# Patient Record
Sex: MALE | Race: WHITE | NOT HISPANIC OR LATINO | Employment: FULL TIME | URBAN - METROPOLITAN AREA
[De-identification: names, ages, dates, MRNs, and addresses within clinical notes are randomized per-mention and may not be internally consistent; named-entity substitution may affect disease eponyms.]

---

## 2021-01-11 ENCOUNTER — HOSPITAL ENCOUNTER (OUTPATIENT)
Dept: OTHER | Facility: HOSPITAL | Age: 66
Discharge: HOME | End: 2021-01-11
Attending: INTERNAL MEDICINE

## 2021-01-11 LAB
ALBUMIN SERPL-MCNC: 3.9 G/DL (ref 3.5–5)
ALBUMIN/GLOB SERPL: 1.1 {RATIO} (ref 1.4–2.6)
ALP SERPL-CCNC: 131 U/L (ref 56–155)
ALT SERPL-CCNC: 22 U/L (ref 10–40)
AMYLASE SERPL-CCNC: 48 U/L (ref 30–110)
ANION GAP SERPL CALC-SCNC: 13 MMOL/L (ref 8–19)
APPEARANCE UR: CLEAR
AST SERPL-CCNC: 20 U/L (ref 15–50)
BASOPHILS # BLD MANUAL: 0.04 10*3/UL (ref 0–0.2)
BASOPHILS NFR BLD MANUAL: 0.4 % (ref 0–3)
BILIRUB SERPL-MCNC: 0.6 MG/DL (ref 0.2–1.3)
BILIRUB UR QL: NEGATIVE
BUN SERPL-MCNC: 19 MG/DL (ref 5–25)
BUN/CREAT SERPL: 17 {RATIO} (ref 6–20)
CALCIUM SERPL-MCNC: 9.4 MG/DL (ref 8.7–10.4)
CHLORIDE SERPL-SCNC: 96 MMOL/L (ref 99–111)
COLOR UR: ABNORMAL
CONV CO2: 27 MMOL/L (ref 22–32)
CONV LEUKOCYTE ESTERASE: NEGATIVE
CONV TOTAL PROTEIN: 7.5 G/DL (ref 6.3–8.2)
CONV UROBILINOGEN IN URINE BY AUTOMATED TEST STRIP: 0.2 {EHRLICHU}/DL (ref 0.1–1)
CREAT UR-MCNC: 1.11 MG/DL (ref 0.7–1.2)
DEPRECATED RDW RBC AUTO: 41.9 FL
EOSINOPHIL # BLD MANUAL: 0.05 10*3/UL (ref 0–0.7)
EOSINOPHIL NFR BLD MANUAL: 0.5 % (ref 0–7)
ERYTHROCYTE [DISTWIDTH] IN BLOOD BY AUTOMATED COUNT: 12.3 % (ref 11.5–14.5)
GFR SERPLBLD BASED ON 1.73 SQ M-ARVRAT: >60 ML/MIN/{1.73_M2}
GLOBULIN UR ELPH-MCNC: 3.6 G/DL (ref 2–3.5)
GLUCOSE 24H UR-MCNC: NEGATIVE MG/DL
GLUCOSE SERPL-MCNC: 115 MG/DL (ref 70–99)
GRANS (ABSOLUTE): 7.63 10*3/UL (ref 2–8)
GRANS: 75.4 % (ref 30–85)
HBA1C MFR BLD: 13.6 G/DL (ref 14–18)
HCT VFR BLD AUTO: 39.8 % (ref 42–52)
HGB UR QL STRIP: NEGATIVE
IMM GRANULOCYTES # BLD: 0.04 10*3/UL (ref 0–0.54)
IMM GRANULOCYTES NFR BLD: 0.4 % (ref 0–0.43)
KETONES UR QL STRIP: NEGATIVE MG/DL
LIPASE SERPL-CCNC: 40 U/L (ref 5–51)
LYMPHOCYTES # BLD MANUAL: 1.85 10*3/UL (ref 1–5)
LYMPHOCYTES NFR BLD MANUAL: 5 % (ref 3–10)
MCH RBC QN AUTO: 31.4 PG (ref 27–31)
MCHC RBC AUTO-ENTMCNC: 34.2 G/DL (ref 33–37)
MCV RBC AUTO: 91.9 FL (ref 80–96)
MONOCYTES # BLD AUTO: 0.51 10*3/UL (ref 0.2–1.2)
NITRITE UR-MCNC: NEGATIVE MG/ML
OSMOLALITY SERPL CALC.SUM OF ELEC: 277 MOSM/KG (ref 273–304)
PH UR STRIP.AUTO: 5.5 [PH] (ref 5–8)
PLATELET # BLD AUTO: 556 10*3/UL (ref 130–400)
PMV BLD AUTO: 9.4 FL (ref 7.4–10.4)
POTASSIUM SERPL-SCNC: 4.4 MMOL/L (ref 3.5–5.3)
PROT UR-MCNC: NEGATIVE MG/DL
RBC # BLD AUTO: 4.33 10*6/UL (ref 4.7–6.1)
SODIUM SERPL-SCNC: 132 MMOL/L (ref 135–147)
SP GR UR STRIP: >=1.03 (ref 1–1.03)
SPECIMEN SOURCE: ABNORMAL
VARIANT LYMPHS NFR BLD MANUAL: 18.3 % (ref 20–45)
WBC # BLD AUTO: 10.12 10*3/UL (ref 4.8–10.8)

## 2021-01-13 LAB — BACTERIA UR CULT: NORMAL

## 2021-01-27 ENCOUNTER — HOSPITAL ENCOUNTER (OUTPATIENT)
Dept: OTHER | Facility: HOSPITAL | Age: 66
Discharge: HOME | End: 2021-01-27
Attending: INTERNAL MEDICINE

## 2021-02-22 ENCOUNTER — OFFICE VISIT CONVERTED (OUTPATIENT)
Dept: FAMILY MEDICINE CLINIC | Age: 66
End: 2021-02-22
Attending: FAMILY MEDICINE

## 2021-04-28 ENCOUNTER — OFFICE VISIT CONVERTED (OUTPATIENT)
Dept: FAMILY MEDICINE CLINIC | Age: 66
End: 2021-04-28
Attending: NURSE PRACTITIONER

## 2021-05-18 NOTE — PROGRESS NOTES
Kendrick Vargas  1955     Office/Outpatient Visit    Visit Date: Wed, Apr 28, 2021 10:49 am    Provider: Swathi De La Cruz N.P. (Assistant: Toña Byrd,  )    Location: [facility]        Electronically signed by Swathi De La Cruz N.P. on  04/28/2021 12:35:52 PM                             Subjective:        CC: Mr. Vargas is a 66 year old White male.  Establish care (Dr Hernandez has been his PCP) would like to come here some too, David is not always available        HPI:           Patient presents with essential (primary) hypertension.  His current cardiac medication regimen includes a combination medication ( Diovan HCT ).  He is taking this rx BID. He is tolerating the medication well without side effects.  Compliance with treatment has been good; he takes his medication as directed.            Low back pain, chronic details; he notes some pain relief with Mobic.        testosterone def    Symptoms are relieved with testosteron inj, getting at [facility].  he takes testosterone inj weekly usually, started on rx 2-3 years  / gets labs at  [facility]      takes ambien prn, better     Takes ambien prn and has taken melatonin     ROS:     CONSTITUTIONAL:  Negative for fever.      EYES:  Positive for blurred vision ( bilaterally ).  does wear readers, >4    CARDIOVASCULAR:  Negative for chest pain, palpitations, tachycardia, orthopnea, and edema.      RESPIRATORY:  Negative for recent cough and dyspnea.      GASTROINTESTINAL:  Positive for constipation ( occ ).   Negative for melena ( recent negative cologuard ).      NEUROLOGICAL:  Positive for dizziness/syncope 1-2021.  has not felt good since his knee surgery, right in , feels like the anesthesia effected him    ENDOCRINE:  Positive for hot flahes/flushed.  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          Past Medical  History / Family History / Social History:         Last Reviewed on 2021 11:00 AM by Swathi De La Cruz    Past Medical History:             PREVENTIVE HEALTH MAINTENANCE             COLORECTAL CANCER SCREENING: Up to date (colonoscopy q10y; sigmoidoscopy q5y; Cologuard q3y) was last done ; colonoscopy with normal results; REPORT NOT IN CHART     DENTAL CLEANING: was last done      EYE EXAM: RECOMMENDED 21         PAST MEDICAL HISTORY         hearing loss bilateral, wears hearing aids         Surgical History:         Joint Replacement: RIGHT KNEE;;; 21; sixth surgery;     Vasectomy         Family History:     Father:  at age 73; Cause of death was liver cancer     Mother:  at age 75; Cause of death was lung cancer;  Type 2 Diabetes     Brother(s): 1 brother(s) total; 1 ;  GSW at 30     Sister(s): Healthy; 1 sister(s) total     Son(s): 2 son(s) total;  Obesity     Daughter(s): 1 daughter(s) total         Social History:     Occupation: [employer]. Retired (Prior occupation: teacher/)     Marital Status:  ( and remarried)     Children: 3 children and 2 step-children     Hobbies/Recreation: he enjoys farming;         Tobacco/Alcohol/Supplements:     Last Reviewed on 2021 12:32 PM by Swathi De La Cruz    Tobacco: He has never smoked.  (he also reports chewing 2 mg nicotine  gum/ 15 per day for 15 years) 21         Immunizations:     COVID-19, mRNA, LNP-S, PF, 100 mcg/0.5 mL dose 2021    COVID-19, mRNA, LNP-S, PF, 100 mcg/0.5 mL dose 3/17/2021        Allergies:     Last Reviewed on 2021 11:00 AM by Swathi De La Cruz    No Known Allergies.        Current Medications:     Last Reviewed on 2021 10:52 AM by Toña Byrd    zolpidem 10 mg oral tablet [take 1/2 to 1 tablet (10 mg) by oral route once daily at bedtime]    testosterone cypionate 200 mg/mL intramuscular Oil [Inject 200mg/ 1ml once a week IM]    Meloxicam 15 mg oral tablet  [Take 1/2 tablet(s) by mouth BID]    melatonin 10 mg oral Tablet,disintegrating    XyzaL 2.5 mg/5 mL oral Solution [take 10 milliliters (5 mg) by oral route once daily in the evening]    VALSART/HCTZ -12.5  [one BID ]        Objective:        Vitals:         Current: 4/28/2021 10:51:51 AM    Ht:  5 ft, 6.5 in;  Wt: 202.6 lbs;  BMI: 32.2T: 97.7 F (temporal);  BP: 124/85 mm Hg (right arm, sitting);  P: 65 bpm (right arm (BP Cuff), sitting)        Exams:     PHYSICAL EXAM:     GENERAL: Vitals recorded well developed, well nourished;  well groomed;  no apparent distress;     NECK: carotid exam reveals no bruits;     RESPIRATORY: normal respiratory rate and pattern with no distress; normal breath sounds with no rales, rhonchi, wheezes or rubs;     CARDIOVASCULAR: normal rate; rhythm is regular;  no systolic murmur; no edema;     PSYCHIATRIC:  appropriate affect and demeanor; normal speech pattern; grossly normal memory;         Assessment:         I10   Essential (primary) hypertension       M54.5   Low back pain, chronic       E29.1   Testicular hypofunction       G47.09   Other insomnia       J30.9   Allergic rhinitis, unspecified       M17.11   Unilateral primary osteoarthritis, right knee       H53.123   Transient visual loss, bilateral           Plan:         Essential (primary) hypertensioncontinue current rx's, get records from [facility] er and [facility]/advised to cut back on chewing nicotine gum/after reviewing records /labs will have him come back in for follow up labs        RECOMMENDATIONS given include: perform routine monitoring of blood pressure with home blood pressure cuff.          Low back pain, chroniccontinue mobic         Testicular hypofunctionsend for last labs         Allergic rhinitis, unspecifiedtry to keep the 5 dogs & 3 cats out of his bedroom, they are his wife's pets, try flonase with xyzal        Unilateral primary osteoarthritis, right kneesend for op notes, Dr colmenares , surgery  done in [place] (thinks his surgery/anesthesia effected)        Transient visual loss, bilateraladvised to see an optometrist for eye exam/will check an A1C when next labs are drawn            Patient Recommendations:        For  Essential (primary) hypertension:    Begin monitoring your blood pressure by brief nurse visits at our office, a home blood pressure monitor, or by checking on the machines in pharmacies or stores.  Keep a log of the readings.              Charge Capture:         Primary Diagnosis:     I10  Essential (primary) hypertension           Orders:      32405  Office/outpatient visit; established patient, level 4  (In-House)              M54.5  Low back pain, chronic     E29.1  Testicular hypofunction     G47.09  Other insomnia     J30.9  Allergic rhinitis, unspecified     M17.11  Unilateral primary osteoarthritis, right knee     H53.123  Transient visual loss, bilateral

## 2021-05-18 NOTE — PROGRESS NOTES
Kendrick Vargas  1955     Office/Outpatient Visit    Visit Date: Mon, Feb 22, 2021 01:51 pm    Provider: Kendrick Deal MD (Assistant: Diana De La Cruz,  )    Location: [facility]        Electronically signed by Kendrick Deal MD on  02/22/2021 05:10:57 PM                             Subjective:        CC: Mr. Vargas is a 65 year old White male.  This is his first visit to the clinic.          HPI:           PHQ-9 Depression Screening: Completed form scanned and in chart; Total Score 7           Dx with essential (primary) hypertension; his current cardiac medication regimen includes a diuretic and an angiotensin receptor blocker.  Compliance with treatment has been good.  Mr. Vargas does not check his blood pressure other than at his clinic appointments.            Complaint of testicular hypofunction..  The symptom began years ago.  The severity is of moderate intensity.  HAD BEEN OFF HIS SHOTS BUT RECENTLY RESTARTED THEM           Additionally, he presents with history of other insomnia.  this has been noted for the past several months.  Sleep has been disrupted by a delay in initiation of sleep and frequent awakenings.  Other details: HAS BEEN ON CHRONIC AMBIEN.      ROS:     CONSTITUTIONAL:  Positive for fatigue ( SINCE RECENT TKR, NEG W/U EXCEPT THE LOW TESTOSTERONE,  FEELING S/W BETTER ).   Negative for chills or fever.      E/N/T:  Negative for ear pain, nasal congestion and sore throat.      CARDIOVASCULAR:  Negative for chest pain and palpitations.      RESPIRATORY:  Negative for recent cough and dyspnea.      GASTROINTESTINAL:  Negative for abdominal pain, nausea and vomiting.      GENITOURINARY:  Negative for dysuria, hematuria and impotence.      MUSCULOSKELETAL:  Positive for back pain ( chronic ).   Negative for myalgias.      INTEGUMENTARY:  Negative for atypical mole(s) and rash.      NEUROLOGICAL:  Positive for weakness ( generalized; ONSET AFTER THE TKR, STATES  NEG W/U EXCEPT FOR THE LOW TESTOSTERONE ).   Negative for headaches.      PSYCHIATRIC:  Positive for feelings of stress.   Negative for suicidal thoughts.          Past Medical History / Family History / Social History:         Last Reviewed on 2/22/2021 04:51 PM by Kendrick Deal    Past Medical History:             PREVENTIVE HEALTH MAINTENANCE             COLORECTAL CANCER SCREENING: Up to date (colonoscopy q10y; sigmoidoscopy q5y; Cologuard q3y) was last done 2011; colonoscopy with normal results; REPORT NOT IN CHART     DENTAL CLEANING: was last done 2020     EYE EXAM: RECOMMENDED 2/22/21         Surgical History:         Joint Replacement: RIGHT KNEE;;;     Vasectomy         Social History:     Occupation:    Retired     Marital Status:          Tobacco/Alcohol/Supplements:     Last Reviewed on 2/22/2021 04:51 PM by Kendrick Deal    Tobacco: He has never smoked.          Substance Abuse History:     Last Reviewed on 2/22/2021 04:51 PM by Kendrick Deal    None         Mental Health History:     Last Reviewed on 7/13/2015 02:23 PM by Kasey Carroll        Communicable Diseases (eg STDs):     Last Reviewed on 7/13/2015 02:23 PM by Kasey Carroll        Current Problems:     Last Reviewed on 2/22/2021 04:51 PM by Kendrick Deal    Essential (primary) hypertension    Hypertension    BRIELLE (CPAP)    Testicular hypofunction    Other insomnia    Low back pain, chronic    Encounter for screening for depression    Other long term (current) drug therapy        Immunizations:     None        Allergies:     Last Reviewed on 2/22/2021 04:51 PM by Kendrick Deal    No Known Allergies.        Current Medications:     Last Reviewed on 2/22/2021 04:51 PM by Kendrick Deal    Meloxicam 15 mg oral tablet [Take 1/2 tablet(s) by mouth BID]    zolpidem 10 mg oral tablet [take 1/2 to 1 tablet (10 mg) by oral route once daily at bedtime]     valsartan-hydrochlorothiazide 160-12.5 mg oral tablet [take 1 tablet by oral route once daily]    testosterone cypionate         Objective:        Vitals:         Current: 2/22/2021 2:03:23 PM    Ht:  5 ft, 6.5 in;  Wt: 201.6 lbs;  BMI: 32.1T: 95.1 F (oral);  BP: 141/87 mm Hg (left arm, sitting);  P: 67 bpm (left arm (BP Cuff), sitting)        Exams:     PHYSICAL EXAM:     GENERAL: Vitals recorded well developed, well nourished;  well groomed;  no apparent distress;     NECK: trachea is midline; thyroid is non-palpable;     RESPIRATORY: normal respiratory rate and pattern with no distress; normal breath sounds with no rales, rhonchi, wheezes or rubs;     CARDIOVASCULAR: normal rate; rhythm is regular;  no systolic murmur; no edema;     GASTROINTESTINAL: nontender;     LYMPHATICS:  no adenopathy in cervical, supraclavicular, axillary, or inguinal regions;     NEUROLOGIC: GROSSLY INTACT     PSYCHIATRIC:  appropriate affect and demeanor; normal speech pattern; grossly normal memory;         Lab/Test Results:         LABORATORY RESULTS:     STATES NORMAL ROUTINE LABS < TWO MONTHS AGO;     Amphetamines Screen, Urin: Negative (02/22/2021),     BAR-Barbiturates Screen, Urin: Negative (02/22/2021),     Buprenorphine: Negative (02/22/2021),     BZO-Benzodiazepines Screen,Ur: Negative (02/22/2021),     Cocaine(Metab.)Screen, Ur: Negative (02/22/2021),     MDMA-Ecstasy: Negative (02/22/2021),     Met-Methamphetamine: Negative (02/22/2021),     MTD-Methadone Screen, Urine: Negative (02/22/2021),     Opiate Screen, Urine: Positive (02/22/2021),     OXY-Oxycodone: Negative (02/22/2021),     PCP-Phencyclidine Screen, Uri: Negative (02/22/2021),     THC Cannabinoids Screen, Urin: Negative (02/22/2021),     Urine temperature: confirmed (02/22/2021),     Date and time of last pill: ambien 2/21/21 10pm; hydrocodone 2/20/21; testosterone 2/19/21 (02/22/2021),     Performed by: may (02/22/2021),     Collection Time: 15:24 (02/22/2021),              Assessment:         I10   Essential (primary) hypertension       E29.1   Testicular hypofunction       G47.09   Other insomnia       Z79.899   Other long term (current) drug therapy       Z13.31   Encounter for screening for depression           ORDERS:         Lab Orders:       [id number]  Drug test prsmv qual dir optical obs per day  (In-House)              Other Orders:         Depression screen negative  (In-House)                      Plan:         Essential (primary) hypertension        MEDICATIONS: (no change to current medication regimen)     RECOMMENDATIONS given include: perform routine monitoring of blood pressure with home blood pressure cuff.      FOLLOW-UP: Schedule a follow-up visit in 2 months.      ONLY FAIR CONTRO ON CURRENT MEDS.  WILL SEND FOR RECORDS.  MIPS Positive Depression Screen but after further evaluation the patient does not have a diagnosis of depression.            Orders:         Depression screen negative  (In-House)              Testicular hypofunction        MEDICATIONS: (no change to current medication regimen)     WILL GET MED DOSE FROM PHARMACY Observe for now Consider further workup         Other insomnia    Controlled substance documentation: Skip reviewed; drug screen performed and appropriate; consent is reviewed and signed and on the chart.  He is aware of risk of addiction on this medication, understands that he will need to follow up for a review every 3 months and his medications will be adjusted or decreased as deemed appropriate at each visit.  No history of drug or alcohol abuse.  No concerns about diversion or abuse. He denies side effects related to the medication.  He is aware that he may be called in for pill counts.  The dosing of this medication will be reviewed on a regular basis and reduced if possible..  Ongoing use of a controlled substance is necessary for this patient to have a normal quality of life Re-check blood pressure         Other  long term (current) drug therapy    LABORATORY:  Labs ordered to be performed today include Drug screen.            Orders:       [id number]  Drug test prsmv qual dir optical obs per day  (In-House)                  Patient Recommendations:        For  Essential (primary) hypertension:    Begin monitoring your blood pressure by brief nurse visits at our office, a home blood pressure monitor, or by checking on the machines in pharmacies or stores.  Keep a log of the readings.  Schedule a follow-up visit in 2 months.          For  Testicular hypofunction:    I also recommend WILL GET MED DOSE FROM PHARMACY.              Charge Capture:         Primary Diagnosis:     I10  Essential (primary) hypertension           Orders:      [id number]  Office visit - new pt, level 3  (In-House)              Depression screen negative  (In-House)              E29.1  Testicular hypofunction     G47.09  Other insomnia     Z79.899  Other long term (current) drug therapy           Orders:      [id number]  Drug test prsmv qual dir optical obs per day  (In-House)              Z13.31  Encounter for screening for depression

## 2021-06-09 ENCOUNTER — OFFICE VISIT (OUTPATIENT)
Dept: FAMILY MEDICINE CLINIC | Age: 66
End: 2021-06-09

## 2021-06-09 VITALS
SYSTOLIC BLOOD PRESSURE: 123 MMHG | HEART RATE: 63 BPM | BODY MASS INDEX: 33.06 KG/M2 | DIASTOLIC BLOOD PRESSURE: 74 MMHG | HEIGHT: 67 IN | TEMPERATURE: 97.3 F | WEIGHT: 210.6 LBS

## 2021-06-09 DIAGNOSIS — R53.83 OTHER FATIGUE: ICD-10-CM

## 2021-06-09 DIAGNOSIS — E29.1 TESTOSTERONE DEFICIENCY IN MALE: ICD-10-CM

## 2021-06-09 DIAGNOSIS — J30.89 SEASONAL ALLERGIC RHINITIS DUE TO OTHER ALLERGIC TRIGGER: ICD-10-CM

## 2021-06-09 DIAGNOSIS — I10 ESSENTIAL HYPERTENSION: Primary | ICD-10-CM

## 2021-06-09 PROBLEM — J30.9 ALLERGIC RHINITIS DUE TO ALLERGEN: Status: ACTIVE | Noted: 2021-06-09

## 2021-06-09 PROCEDURE — 99214 OFFICE O/P EST MOD 30 MIN: CPT | Performed by: NURSE PRACTITIONER

## 2021-06-09 RX ORDER — MELOXICAM 15 MG/1
15 TABLET ORAL DAILY
COMMUNITY

## 2021-06-09 RX ORDER — ZOLPIDEM TARTRATE 5 MG/1
5 TABLET ORAL NIGHTLY PRN
COMMUNITY
End: 2022-05-16

## 2021-06-09 RX ORDER — TESTOSTERONE CYPIONATE 200 MG/ML
1 VIAL (ML) INTRAMUSCULAR WEEKLY
COMMUNITY
End: 2022-10-24

## 2021-06-09 RX ORDER — VALSARTAN AND HYDROCHLOROTHIAZIDE 160; 12.5 MG/1; MG/1
1 TABLET, FILM COATED ORAL 2 TIMES DAILY
COMMUNITY
End: 2021-12-09 | Stop reason: SDUPTHER

## 2021-06-09 NOTE — PROGRESS NOTES
Kendrick Vargas presents to [facility].    Chief Complaint:  Follow-up (6 MONTH- CHRONIC CONDTIONS)         History of Present Illness:  BP follow up/ running good.   Feels hot most of the time, then sweats, worse since surgery.  Intermittent. Blurred vision and dizziness since surgery. No vision exam, last knee surgery , works out 5 days a week , having stiffness , nasal pamella but only at night, he works outside every evening, has taken antihistamines.  Gets his testosterone at [facility], given inj there had one last week.        Review of Systems:  Review of Systems   Constitutional: Positive for fatigue. Negative for fever.   HENT: Negative for sore throat.    Respiratory: Negative for cough and shortness of breath.         Medical History:  Past Medical History:   • Allergic rhinitis, unspecified   • Chronic low back pain   • Essential (primary) hypertension   • Hearing loss, bilateral    wears hearing aids   • BRIELLE on CPAP   • Other insomnia   • Other long term (current) drug therapy   • Testicular hypofunction   • Transient visual loss, bilateral   • Unilateral primary osteoarthritis, right knee     Past Surgical History:   • JOINT REPLACEMENT    sixth surgery   • KNEE SURGERY    Right knee OA. removal of RTKR, deep implant removal   • VASECTOMY      Family History   Problem Relation Age of Onset   • Lung cancer Mother    • Diabetes type II Mother    • Liver cancer Father    • Obesity Son      Social History     Tobacco Use   • Smoking status: Never Smoker   • Smokeless tobacco: Never Used   • Tobacco comment: Reports chewing 2mg nicotine gum/ 15 per day for 15 years   Substance Use Topics   • Alcohol use: Not Currently       Health Maintenance Due   Topic Date Due   • TDAP/TD VACCINES (1 - Tdap) Never done   • ZOSTER VACCINE (1 of 2) Never done   • Pneumococcal Vaccine 65+ (1 of 1 - PPSV23) Never done   • HEPATITIS C SCREENING  Never done   • ANNUAL WELLNESS VISIT  Never done  "       Immunization History   Administered Date(s) Administered   • COVID-19 (MODERNA) 03/17/2021, 04/14/2021   • COVID-19 (UNSPECIFIED) 03/17/2021, 04/14/2021       No Known Allergies     Medications:  Current Outpatient Medications on File Prior to Visit   Medication Sig   • meloxicam (MOBIC) 15 MG tablet Take 7.5 mg by mouth Daily.   • Testosterone Cypionate 200 MG/ML solution Inject 1 mL as directed 1 (One) Time Per Week.   • valsartan-hydrochlorothiazide (DIOVAN-HCT) 160-12.5 MG per tablet Take 1 tablet by mouth 2 (two) times a day.   • zolpidem (AMBIEN) 5 MG tablet Take 5 mg by mouth At Night As Needed for Sleep.     No current facility-administered medications on file prior to visit.       Vital Signs:   /74 (BP Location: Right arm, Patient Position: Sitting, Cuff Size: Large Adult)   Pulse 63   Temp 97.3 °F (36.3 °C)   Ht 168.9 cm (66.5\")   Wt 95.5 kg (210 lb 9.6 oz)   BMI 33.48 kg/m²       Physical Exam:  Physical Exam  Vitals reviewed.   Constitutional:       General: He is not in acute distress.     Appearance: Normal appearance.   Neck:      Vascular: No carotid bruit.   Cardiovascular:      Rate and Rhythm: Normal rate and regular rhythm.      Heart sounds: No murmur heard.     Pulmonary:      Effort: Pulmonary effort is normal. No respiratory distress.      Breath sounds: Normal breath sounds.   Musculoskeletal:      Right lower leg: No edema.      Left lower leg: No edema.   Neurological:      Mental Status: He is alert.   Psychiatric:         Mood and Affect: Mood normal.         Behavior: Behavior normal.         Result Review      The following data was reviewed by: TY Ornelas on 06/09/2021: reviewed labs 1-11-21, no TSH or testosterone labs available and pt reports not had recently     No results found for: HGBA1C            Assessment and Plan:          Diagnoses and all orders for this visit:    1. Essential hypertension (Primary)  Assessment & Plan:  Continue to " monitor his BP and rx.  Avoid decongestants.      Orders:  -     Comprehensive Metabolic Panel  -     Lipid Panel    2. Testosterone deficiency in male  Assessment & Plan:  He has been seeing Dr Hernandez, gets weekly testosterone inj.  Had last inj a week or so ago, gets his testosterone inj at [facility].  He will return to our lab fasting tomorrow am.      Orders:  -     Testosterone, Free, Total; Future    3. Seasonal allergic rhinitis due to other allergic trigger  Assessment & Plan:  Discussed allergen avoidance, flonase, nasal saline irrigations.        4. Other fatigue  -     CBC & Differential; Future  -     TSH; Future    Follow Up   Return if symptoms worsen or fail to improve.  Patient was given instructions and counseling regarding his condition or for health maintenance advice. Please see specific information pulled into the AVS if appropriate.

## 2021-06-09 NOTE — ASSESSMENT & PLAN NOTE
He has been seeing Dr Hernandez, gets weekly testosterone inj.  Had last inj a week or so ago, gets his testosterone inj at [facility].  He will return to our lab fasting tomorrow am.

## 2021-06-14 ENCOUNTER — LAB (OUTPATIENT)
Dept: LAB | Facility: HOSPITAL | Age: 66
End: 2021-06-14

## 2021-06-14 DIAGNOSIS — E29.1 TESTOSTERONE DEFICIENCY IN MALE: ICD-10-CM

## 2021-06-14 DIAGNOSIS — R53.83 OTHER FATIGUE: ICD-10-CM

## 2021-06-14 LAB
ALBUMIN SERPL-MCNC: 4 G/DL (ref 3.5–5.2)
ALBUMIN/GLOB SERPL: 1.7 G/DL
ALP SERPL-CCNC: 109 U/L (ref 39–117)
ALT SERPL W P-5'-P-CCNC: 27 U/L (ref 1–41)
ANION GAP SERPL CALCULATED.3IONS-SCNC: 8 MMOL/L (ref 5–15)
AST SERPL-CCNC: 30 U/L (ref 1–40)
BASOPHILS # BLD AUTO: 0.03 10*3/MM3 (ref 0–0.2)
BASOPHILS NFR BLD AUTO: 0.5 % (ref 0–1.5)
BILIRUB SERPL-MCNC: 0.6 MG/DL (ref 0–1.2)
BUN SERPL-MCNC: 21 MG/DL (ref 8–23)
BUN/CREAT SERPL: 17.6 (ref 7–25)
CALCIUM SPEC-SCNC: 8.8 MG/DL (ref 8.6–10.5)
CHLORIDE SERPL-SCNC: 102 MMOL/L (ref 98–107)
CHOLEST SERPL-MCNC: 120 MG/DL (ref 0–200)
CO2 SERPL-SCNC: 28 MMOL/L (ref 22–29)
CREAT SERPL-MCNC: 1.19 MG/DL (ref 0.76–1.27)
DEPRECATED RDW RBC AUTO: 49.3 FL (ref 37–54)
EOSINOPHIL # BLD AUTO: 0.26 10*3/MM3 (ref 0–0.4)
EOSINOPHIL NFR BLD AUTO: 3.9 % (ref 0.3–6.2)
ERYTHROCYTE [DISTWIDTH] IN BLOOD BY AUTOMATED COUNT: 14.4 % (ref 12.3–15.4)
GFR SERPL CREATININE-BSD FRML MDRD: 61 ML/MIN/1.73
GLOBULIN UR ELPH-MCNC: 2.4 GM/DL
GLUCOSE SERPL-MCNC: 106 MG/DL (ref 65–99)
HCT VFR BLD AUTO: 48.8 % (ref 37.5–51)
HDLC SERPL-MCNC: 31 MG/DL (ref 40–60)
HGB BLD-MCNC: 16.4 G/DL (ref 13–17.7)
IMM GRANULOCYTES # BLD AUTO: 0.04 10*3/MM3 (ref 0–0.05)
IMM GRANULOCYTES NFR BLD AUTO: 0.6 % (ref 0–0.5)
LDLC SERPL CALC-MCNC: 75 MG/DL (ref 0–100)
LDLC/HDLC SERPL: 2.43 {RATIO}
LYMPHOCYTES # BLD AUTO: 2.12 10*3/MM3 (ref 0.7–3.1)
LYMPHOCYTES NFR BLD AUTO: 31.9 % (ref 19.6–45.3)
MCH RBC QN AUTO: 31.7 PG (ref 26.6–33)
MCHC RBC AUTO-ENTMCNC: 33.6 G/DL (ref 31.5–35.7)
MCV RBC AUTO: 94.2 FL (ref 79–97)
MONOCYTES # BLD AUTO: 0.61 10*3/MM3 (ref 0.1–0.9)
MONOCYTES NFR BLD AUTO: 9.2 % (ref 5–12)
NEUTROPHILS NFR BLD AUTO: 3.58 10*3/MM3 (ref 1.7–7)
NEUTROPHILS NFR BLD AUTO: 53.9 % (ref 42.7–76)
PLATELET # BLD AUTO: 185 10*3/MM3 (ref 140–450)
PMV BLD AUTO: 10.6 FL (ref 6–12)
POTASSIUM SERPL-SCNC: 4.2 MMOL/L (ref 3.5–5.2)
PROT SERPL-MCNC: 6.4 G/DL (ref 6–8.5)
RBC # BLD AUTO: 5.18 10*6/MM3 (ref 4.14–5.8)
SODIUM SERPL-SCNC: 138 MMOL/L (ref 136–145)
TRIGL SERPL-MCNC: 68 MG/DL (ref 0–150)
TSH SERPL DL<=0.05 MIU/L-ACNC: 3.33 UIU/ML (ref 0.27–4.2)
VLDLC SERPL-MCNC: 14 MG/DL (ref 5–40)
WBC # BLD AUTO: 6.64 10*3/MM3 (ref 3.4–10.8)

## 2021-06-14 PROCEDURE — 36415 COLL VENOUS BLD VENIPUNCTURE: CPT | Performed by: NURSE PRACTITIONER

## 2021-06-14 PROCEDURE — 80061 LIPID PANEL: CPT | Performed by: NURSE PRACTITIONER

## 2021-06-14 PROCEDURE — 84403 ASSAY OF TOTAL TESTOSTERONE: CPT

## 2021-06-14 PROCEDURE — 84443 ASSAY THYROID STIM HORMONE: CPT

## 2021-06-14 PROCEDURE — 85025 COMPLETE CBC W/AUTO DIFF WBC: CPT

## 2021-06-14 PROCEDURE — 80053 COMPREHEN METABOLIC PANEL: CPT | Performed by: NURSE PRACTITIONER

## 2021-06-14 PROCEDURE — 84402 ASSAY OF FREE TESTOSTERONE: CPT

## 2021-06-17 ENCOUNTER — TELEPHONE (OUTPATIENT)
Dept: FAMILY MEDICINE CLINIC | Age: 66
End: 2021-06-17

## 2021-06-17 LAB
TESTOST FREE SERPL-MCNC: 11.3 PG/ML (ref 6.6–18.1)
TESTOST SERPL-MCNC: 522 NG/DL (ref 264–916)

## 2021-06-17 NOTE — TELEPHONE ENCOUNTER
Pt calling for lab results from 06/14/21. KHADRA eD La Cruz has not reviewed yet. He said he was able to look at them but just wanted her input.

## 2021-06-17 NOTE — TELEPHONE ENCOUNTER
Caller: Kendrick Vargas    Relationship: Self    Best call back number: [phone]  Caller requesting test results: SELF   What test was performed: LAB WORK

## 2021-06-18 NOTE — TELEPHONE ENCOUNTER
I was waiting on them all, testosterone came in a little later, nothing in his labs explain his fatigue, checking back on him, wanted him to see the labs and respond

## 2021-07-02 VITALS
DIASTOLIC BLOOD PRESSURE: 87 MMHG | SYSTOLIC BLOOD PRESSURE: 141 MMHG | HEART RATE: 67 BPM | HEIGHT: 67 IN | BODY MASS INDEX: 31.64 KG/M2 | TEMPERATURE: 95.1 F | WEIGHT: 201.6 LBS

## 2021-07-02 VITALS
BODY MASS INDEX: 31.8 KG/M2 | DIASTOLIC BLOOD PRESSURE: 85 MMHG | TEMPERATURE: 97.7 F | HEIGHT: 67 IN | HEART RATE: 65 BPM | SYSTOLIC BLOOD PRESSURE: 124 MMHG | WEIGHT: 202.6 LBS

## 2021-07-15 ENCOUNTER — CLINICAL SUPPORT (OUTPATIENT)
Dept: FAMILY MEDICINE CLINIC | Age: 66
End: 2021-07-15

## 2021-07-15 VITALS — HEART RATE: 56 BPM | SYSTOLIC BLOOD PRESSURE: 121 MMHG | DIASTOLIC BLOOD PRESSURE: 79 MMHG

## 2021-07-27 ENCOUNTER — TELEPHONE (OUTPATIENT)
Dept: FAMILY MEDICINE CLINIC | Age: 66
End: 2021-07-27

## 2021-07-27 NOTE — TELEPHONE ENCOUNTER
HUB TO READ    HE SAID HE STILL HASN'T GOTTEN A MESSAGE ABOUT HIS LAB RESULTS.     PLEASE FOLLOW UP

## 2021-12-09 ENCOUNTER — OFFICE VISIT (OUTPATIENT)
Dept: FAMILY MEDICINE CLINIC | Age: 66
End: 2021-12-09

## 2021-12-09 ENCOUNTER — LAB (OUTPATIENT)
Dept: LAB | Facility: HOSPITAL | Age: 66
End: 2021-12-09

## 2021-12-09 VITALS
DIASTOLIC BLOOD PRESSURE: 68 MMHG | BODY MASS INDEX: 34.66 KG/M2 | HEART RATE: 54 BPM | WEIGHT: 218 LBS | SYSTOLIC BLOOD PRESSURE: 122 MMHG

## 2021-12-09 DIAGNOSIS — I10 HYPERTENSION, UNSPECIFIED TYPE: Primary | ICD-10-CM

## 2021-12-09 DIAGNOSIS — I10 HYPERTENSION, UNSPECIFIED TYPE: ICD-10-CM

## 2021-12-09 DIAGNOSIS — Z23 ENCOUNTER FOR IMMUNIZATION: ICD-10-CM

## 2021-12-09 LAB
ANION GAP SERPL CALCULATED.3IONS-SCNC: 9.2 MMOL/L (ref 5–15)
BUN SERPL-MCNC: 14 MG/DL (ref 8–23)
BUN/CREAT SERPL: 12 (ref 7–25)
CALCIUM SPEC-SCNC: 9.4 MG/DL (ref 8.6–10.5)
CHLORIDE SERPL-SCNC: 98 MMOL/L (ref 98–107)
CO2 SERPL-SCNC: 26.8 MMOL/L (ref 22–29)
CREAT SERPL-MCNC: 1.17 MG/DL (ref 0.76–1.27)
GFR SERPL CREATININE-BSD FRML MDRD: 63 ML/MIN/1.73
GLUCOSE SERPL-MCNC: 102 MG/DL (ref 65–99)
POTASSIUM SERPL-SCNC: 4.6 MMOL/L (ref 3.5–5.2)
SODIUM SERPL-SCNC: 134 MMOL/L (ref 136–145)

## 2021-12-09 PROCEDURE — 90662 IIV NO PRSV INCREASED AG IM: CPT | Performed by: NURSE PRACTITIONER

## 2021-12-09 PROCEDURE — G0008 ADMIN INFLUENZA VIRUS VAC: HCPCS | Performed by: NURSE PRACTITIONER

## 2021-12-09 PROCEDURE — 80048 BASIC METABOLIC PNL TOTAL CA: CPT

## 2021-12-09 PROCEDURE — 99213 OFFICE O/P EST LOW 20 MIN: CPT | Performed by: NURSE PRACTITIONER

## 2021-12-09 PROCEDURE — 36415 COLL VENOUS BLD VENIPUNCTURE: CPT

## 2021-12-09 RX ORDER — VALSARTAN AND HYDROCHLOROTHIAZIDE 160; 12.5 MG/1; MG/1
1 TABLET, FILM COATED ORAL 2 TIMES DAILY
Qty: 180 TABLET | Refills: 0 | Status: SHIPPED | OUTPATIENT
Start: 2021-12-09 | End: 2022-05-02

## 2021-12-09 NOTE — ASSESSMENT & PLAN NOTE
Hypertension is stable. Continue to monitor BP at home. Continue current meds. Continue to modify diet and lifestyle. Will need labs every 6 months and follow up.   Reviewed labs from June 2021

## 2021-12-09 NOTE — PROGRESS NOTES
Kendrick Vargas presents to [facility].    Chief Complaint:  Hypertension         History of Present Illness:  Hypertension:  Current medication valsartan/ HCTZ   Tolerating Medication: Yes  Checking BP at home and it is not checking   Needs refills: Yes [facility]   Labs   Lab Results       Component                Value               Date                       GLUCOSE                  106 (H)             06/14/2021                 BUN                      21                  06/14/2021                 CREATININE               1.19                06/14/2021                 EGFRIFNONA               61                  06/14/2021                 BCR                      17.6                06/14/2021                 K                        4.2                 06/14/2021                 CO2                      28.0                06/14/2021                 CALCIUM                  8.8                 06/14/2021                 ALBUMIN                  4.00                06/14/2021                 LABIL2                   1.1 (L)             01/11/2021                 AST                      30                  06/14/2021                 ALT                      27                  06/14/2021              Sees [facility], was on testosterone and was getting the inj at [facility]    is on ambien half pill at HS from dr lucas  Is on mobic for joint pain /helps   Has had covid booster at [facility]   Past Medical History:  Changes since June 2021: none           PREVENTIVE HEALTH MAINTENANCE             COLORECTAL CANCER SCREENING: Up to date (colonoscopy q10y; sigmoidoscopy q5y; Cologuard q3y) was last done 2011; colonoscopy with normal results; REPORT NOT IN CHART     DENTAL CLEANING: was last done 2020     EYE EXAM: RECOMMENDED 2/22/21         PAST MEDICAL HISTORY         hearing loss bilateral, wears hearing aids         Surgical History:         Joint Replacement: RIGHT KNEE;;;  20; sixth surgery;     Vasectomy         Family History:     Father:  at age 73; Cause of death was liver cancer     Mother:  at age 75; Cause of death was lung cancer;  Type 2 Diabetes     Brother(s): 1 brother(s) total; 1 ;  GSW at 30     Sister(s): Healthy; 1 sister(s) total     Son(s): 2 son(s) total;  Obesity     Daughter(s): 1 daughter(s) total         Social History:     Occupation: [employer]. Retired (Prior occupation: teacher/)     Marital Status:  ( and remarried)     Children: 3 children and 2 step-children     Hobbies/Recreation: he enjoys farming;           Review of Systems:  Review of Systems   Constitutional: Negative for fatigue and fever.   Respiratory: Negative for cough and shortness of breath.    Cardiovascular: Negative for chest pain, palpitations and leg swelling.   Musculoskeletal:        Joint pain, takes mobic    Neurological: Negative for numbness.          Current Outpatient Medications:   •  meloxicam (MOBIC) 15 MG tablet, Take 7.5 mg by mouth Daily., Disp: , Rfl:   •  Testosterone Cypionate 200 MG/ML solution, Inject 1 mL as directed 1 (One) Time Per Week., Disp: , Rfl:   •  valsartan-hydrochlorothiazide (DIOVAN-HCT) 160-12.5 MG per tablet, Take 1 tablet by mouth 2 (Two) Times a Day., Disp: 180 tablet, Rfl: 0  •  zolpidem (AMBIEN) 5 MG tablet, Take 5 mg by mouth At Night As Needed for Sleep., Disp: , Rfl:     Vital Signs:   Vitals:    21 1317   BP: 122/68   BP Location: Left arm   Patient Position: Sitting   Pulse: 54   Weight: 98.9 kg (218 lb)         Physical Exam:  Physical Exam  Vitals reviewed.   Constitutional:       General: He is not in acute distress.     Appearance: Normal appearance.   Neck:      Vascular: No carotid bruit.   Cardiovascular:      Rate and Rhythm: Normal rate and regular rhythm.      Heart sounds: Normal heart sounds. No murmur heard.      Pulmonary:      Effort: Pulmonary effort is normal. No respiratory  distress.      Breath sounds: Normal breath sounds.   Musculoskeletal:      Right lower leg: No edema.      Left lower leg: No edema.   Neurological:      Mental Status: He is alert.   Psychiatric:         Mood and Affect: Mood normal.         Behavior: Behavior normal.         Result Review      The following data was reviewed by: TY Ornelas on 12/09/2021:    Results for orders placed or performed in visit on 06/14/21   Testosterone, Free, Total    Specimen: Blood   Result Value Ref Range    Testosterone, Total 522 264 - 916 ng/dL    Testosterone, Free 11.3 6.6 - 18.1 pg/mL   TSH    Specimen: Blood   Result Value Ref Range    TSH 3.330 0.270 - 4.200 uIU/mL   CBC Auto Differential    Specimen: Blood   Result Value Ref Range    WBC 6.64 3.40 - 10.80 10*3/mm3    RBC 5.18 4.14 - 5.80 10*6/mm3    Hemoglobin 16.4 13.0 - 17.7 g/dL    Hematocrit 48.8 37.5 - 51.0 %    MCV 94.2 79.0 - 97.0 fL    MCH 31.7 26.6 - 33.0 pg    MCHC 33.6 31.5 - 35.7 g/dL    RDW 14.4 12.3 - 15.4 %    RDW-SD 49.3 37.0 - 54.0 fl    MPV 10.6 6.0 - 12.0 fL    Platelets 185 140 - 450 10*3/mm3    Neutrophil % 53.9 42.7 - 76.0 %    Lymphocyte % 31.9 19.6 - 45.3 %    Monocyte % 9.2 5.0 - 12.0 %    Eosinophil % 3.9 0.3 - 6.2 %    Basophil % 0.5 0.0 - 1.5 %    Immature Grans % 0.6 (H) 0.0 - 0.5 %    Neutrophils, Absolute 3.58 1.70 - 7.00 10*3/mm3    Lymphocytes, Absolute 2.12 0.70 - 3.10 10*3/mm3    Monocytes, Absolute 0.61 0.10 - 0.90 10*3/mm3    Eosinophils, Absolute 0.26 0.00 - 0.40 10*3/mm3    Basophils, Absolute 0.03 0.00 - 0.20 10*3/mm3    Immature Grans, Absolute 0.04 0.00 - 0.05 10*3/mm3               Assessment and Plan:          Diagnoses and all orders for this visit:    1. Hypertension, unspecified type (Primary)  Assessment & Plan:  Hypertension is stable. Continue to monitor BP at home. Continue current meds. Continue to modify diet and lifestyle. Will need labs every 6 months and follow up.   Reviewed labs from June 2021      Orders:  -     valsartan-hydrochlorothiazide (DIOVAN-HCT) 160-12.5 MG per tablet; Take 1 tablet by mouth 2 (Two) Times a Day.  Dispense: 180 tablet; Refill: 0  -     Basic Metabolic Panel; Future    2. Encounter for immunization  -     Fluzone High-Dose 65+yrs        Follow Up {[facility]  [employer] :23}  Return in about 6 months (around 6/9/2022).  Patient was given instructions and counseling regarding his condition or for health maintenance advice. Please see specific information pulled into the AVS if appropriate.

## 2021-12-16 ENCOUNTER — CLINICAL SUPPORT (OUTPATIENT)
Dept: FAMILY MEDICINE CLINIC | Age: 66
End: 2021-12-16

## 2021-12-16 VITALS — DIASTOLIC BLOOD PRESSURE: 83 MMHG | HEART RATE: 58 BPM | SYSTOLIC BLOOD PRESSURE: 123 MMHG

## 2021-12-16 DIAGNOSIS — I10 HYPERTENSION, UNSPECIFIED TYPE: Primary | ICD-10-CM

## 2021-12-16 PROCEDURE — 99211 OFF/OP EST MAY X REQ PHY/QHP: CPT | Performed by: NURSE PRACTITIONER

## 2022-04-14 ENCOUNTER — OFFICE VISIT (OUTPATIENT)
Dept: FAMILY MEDICINE CLINIC | Age: 67
End: 2022-04-14

## 2022-04-14 VITALS
SYSTOLIC BLOOD PRESSURE: 130 MMHG | WEIGHT: 223.4 LBS | HEART RATE: 84 BPM | DIASTOLIC BLOOD PRESSURE: 85 MMHG | BODY MASS INDEX: 35.52 KG/M2

## 2022-04-14 DIAGNOSIS — Z23 NEED FOR PNEUMOCOCCAL VACCINE: Primary | ICD-10-CM

## 2022-04-14 DIAGNOSIS — Z00.00 ROUTINE GENERAL MEDICAL EXAMINATION AT A HEALTH CARE FACILITY: ICD-10-CM

## 2022-04-14 PROCEDURE — 1170F FXNL STATUS ASSESSED: CPT | Performed by: NURSE PRACTITIONER

## 2022-04-14 PROCEDURE — G0439 PPPS, SUBSEQ VISIT: HCPCS | Performed by: NURSE PRACTITIONER

## 2022-04-14 PROCEDURE — 1159F MED LIST DOCD IN RCRD: CPT | Performed by: NURSE PRACTITIONER

## 2022-04-14 PROCEDURE — G0009 ADMIN PNEUMOCOCCAL VACCINE: HCPCS | Performed by: NURSE PRACTITIONER

## 2022-04-14 PROCEDURE — 90732 PPSV23 VACC 2 YRS+ SUBQ/IM: CPT | Performed by: NURSE PRACTITIONER

## 2022-04-14 NOTE — ASSESSMENT & PLAN NOTE
Advise regular exercise, healthy eating, always wear seat belts. Living will discussed, booklet given, fall prevention discussed.  Immunizations discussed.   To set up a yearly optometry and dental exams.    Send for records from Dr Hernandez, looking for tetanus vaccine and PSA and other labs  Advised to stop nicorette gum

## 2022-04-14 NOTE — PROGRESS NOTES
The ABCs of the Annual Wellness Visit  Subsequent Medicare Wellness Visit    Chief Complaint   Patient presents with   • Medicare Wellness-subsequent      He sees dr lance alvarez    Subjective    History of Present Illness:  Kendrick Vargas is a 67 y.o. male who presents for a Subsequent Medicare Wellness Visit.  Medicare wellness HPI  Exercises regularly: yes at least 4 times a week    Eats healthy:no   PSA: may have done at [facility]   Wears seatbelts:no  Living will:none   Optometrist: not seen one in years   Dentist: not been in over a year  Tobacco:nicotine gum multiple daily   Alcohol intake:never   Drugs:none   Falls:none   Colonoscopy: 3-23-21 cologuard negative   Immunizations:had 3 covid and his flu vaccines, unsure on tetanus         Past Medical History:             PREVENTIVE HEALTH MAINTENANCE             COLORECTAL CANCER SCREENING: Up to date (colonoscopy q10y; sigmoidoscopy q5y; Cologuard q3y) was last done ; colonoscopy with normal results; REPORT NOT IN CHART     DENTAL CLEANING: was last done        hearing loss bilateral, wears hearing aids         Surgical History:         Joint Replacement: RIGHT KNEE;;; 20; sixth surgery;     Vasectomy         Family History:     Father:  at age 73; Cause of death was liver cancer     Mother:  at age 75; Cause of death was lung cancer;  Type 2 Diabetes     Brother(s): 1 brother(s) total; 1 ;  GSW at 30     Sister(s): Healthy; 1 sister(s) total     Son(s): 2 son(s) total;  Obesity     Daughter(s): 1 daughter(s) total         Social History:     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    Compared to one year ago, the patient feels his physical   health is the  same.    Compared to one year ago, the patient feels his mental   health is the same.    Recent Hospitalizations:  He was not admitted within the past 365 days.    Current Medical Providers:  Patient Care Team:  Swathi De La Cruz APRN as PCP - General (Family Medicine)    Outpatient Medications Prior to Visit   Medication Sig Dispense Refill   • meloxicam (MOBIC) 15 MG tablet Take 7.5 mg by mouth Daily.     • Testosterone Cypionate 200 MG/ML solution Inject 1 mL as directed 1 (One) Time Per Week.     • valsartan-hydrochlorothiazide (DIOVAN-HCT) 160-12.5 MG per tablet Take 1 tablet by mouth 2 (Two) Times a Day. 180 tablet 0   • zolpidem (AMBIEN) 5 MG tablet Take 5 mg by mouth At Night As Needed for Sleep.       No facility-administered medications prior to visit.       No opioid medication identified on active medication list. I have reviewed chart for other potential  high risk medication/s and harmful drug interactions in the elderly.          Aspirin is not on active medication list.  Aspirin use is not indicated based on review of current medical condition/s. Risk of harm outweighs potential benefits.  .    Patient Active Problem List   Diagnosis   • Hypertension   • Testosterone deficiency in male   • Allergic rhinitis due to allergen   • Other fatigue   • Routine general medical examination at a health care facility   • Need for pneumococcal vaccine     Advance Care Planning  Advance Directive is not on file.  ACP discussion was declined by the patient. Patient does not have an advance directive, information provided.    Review of Systems   Constitutional: Negative for fatigue and fever.   HENT: Negative for sore throat.    Eyes: Negative for visual disturbance.   Respiratory: Negative for cough and shortness of breath.    Cardiovascular: Negative for chest pain, palpitations and leg swelling.   Gastrointestinal: Negative for abdominal pain and blood in stool.   Genitourinary: Negative for difficulty  urinating.   Musculoskeletal: Positive for arthralgias and back pain.   Skin: Negative for rash.   Hematological: Negative for adenopathy.   Psychiatric/Behavioral: Negative for sleep disturbance.        Objective    Vitals:    04/14/22 1037   BP: 130/85   BP Location: Left arm   Patient Position: Sitting   Pulse: 84   Weight: 101 kg (223 lb 6.4 oz)     Body mass index is 35.52 kg/m².  BMI has not been calculated during today's encounter.     Does the patient have evidence of cognitive impairment? No    Physical Exam  Vitals reviewed.   Constitutional:       Appearance: Normal appearance. He is well-developed.   HENT:      Head: Normocephalic and atraumatic.      Right Ear: External ear normal.      Left Ear: External ear normal.      Mouth/Throat:      Pharynx: No oropharyngeal exudate.   Eyes:      Conjunctiva/sclera: Conjunctivae normal.      Pupils: Pupils are equal, round, and reactive to light.   Cardiovascular:      Rate and Rhythm: Normal rate and regular rhythm.      Heart sounds: No murmur heard.    No friction rub. No gallop.   Pulmonary:      Effort: Pulmonary effort is normal.      Breath sounds: Normal breath sounds. No wheezing or rhonchi.   Abdominal:      General: Bowel sounds are normal. There is no distension.      Palpations: Abdomen is soft.      Tenderness: There is no abdominal tenderness.   Skin:     General: Skin is warm and dry.   Neurological:      Mental Status: He is alert and oriented to person, place, and time.      Cranial Nerves: No cranial nerve deficit.   Psychiatric:         Mood and Affect: Mood and affect normal.         Behavior: Behavior normal.         Thought Content: Thought content normal.         Judgment: Judgment normal.                 HEALTH RISK ASSESSMENT    Smoking Status:  Social History     Tobacco Use   Smoking Status Never Smoker   Smokeless Tobacco Never Used   Tobacco Comment    Reports chewing 2mg nicotine gum/ 15 per day for 15 years     Alcohol  Consumption:  Social History     Substance and Sexual Activity   Alcohol Use Not Currently     Fall Risk Screen:    THOMAS Fall Risk Assessment has not been completed.    Depression Screening:  PHQ-2/PHQ-9 Depression Screening 4/14/2022   Retired PHQ-9 Total Score -   Retired Total Score -   Little Interest or Pleasure in Doing Things 0-->not at all   Feeling Down, Depressed or Hopeless 0-->not at all   PHQ-9: Brief Depression Severity Measure Score 0       Health Habits and Functional and Cognitive Screening:  Functional & Cognitive Status 4/14/2022   Do you have difficulty preparing food and eating? No   Do you have difficulty bathing yourself, getting dressed or grooming yourself? No   Do you have difficulty using the toilet? No   Do you have difficulty moving around from place to place? No   Do you have trouble with steps or getting out of a bed or a chair? No   Current Diet Unhealthy Diet   Dental Exam Not up to date   Eye Exam Not up to date   Exercise (times per week) 4 times per week   Current Exercises Include Light Weights   Do you need help using the phone?  No   Are you deaf or do you have serious difficulty hearing?  Yes   Do you need help with transportation? No   Do you need help shopping? No   Do you need help preparing meals?  No   Do you need help with housework?  No   Do you need help with laundry? No   Do you need help taking your medications? No   Do you need help managing money? No   Do you ever drive or ride in a car without wearing a seat belt? Yes   Have you felt unusual stress, anger or loneliness in the last month? No   Who do you live with? Spouse   If you need help, do you have trouble finding someone available to you? No   Have you been bothered in the last four weeks by sexual problems? No   Do you have difficulty concentrating, remembering or making decisions? No       Age-appropriate Screening Schedule:  Refer to the list below for future screening recommendations based on patient's  age, sex and/or medical conditions. Orders for these recommended tests are listed in the plan section. The patient has been provided with a written plan.    Health Maintenance   Topic Date Due   • TDAP/TD VACCINES (1 - Tdap) Never done   • ZOSTER VACCINE (1 of 2) Never done   • INFLUENZA VACCINE  08/01/2022              Assessment/Plan   CMS Preventative Services Quick Reference  Risk Factors Identified During Encounter  Immunizations Discussed/Encouraged (specific Immunizations; Tdap, Pneumococcal 23 and Shingrix  The above risks/problems have been discussed with the patient.  Follow up actions/plans if indicated are seen below in the Assessment/Plan Section.  Pertinent information has been shared with the patient in the After Visit Summary.    Diagnoses and all orders for this visit:    1. Need for pneumococcal vaccine (Primary)  Assessment & Plan:  Update with pneumonia vaccine today     Orders:  -     Pneumococcal polysaccharide vaccine 23-valent >= 1yo subcutaneous/IM (PPSV23)    2. Routine general medical examination at a health care facility  Assessment & Plan:  Advise regular exercise, healthy eating, always wear seat belts. Living will discussed, booklet given, fall prevention discussed.  Immunizations discussed.   To set up a yearly optometry and dental exams.    Send for records from Dr Hernandez, looking for tetanus vaccine and PSA and other labs  Advised to stop nicorette gum           Follow Up:   Return for PRN .     An After Visit Summary and PPPS were made available to the patient.

## 2022-05-02 DIAGNOSIS — I10 HYPERTENSION, UNSPECIFIED TYPE: ICD-10-CM

## 2022-05-02 RX ORDER — VALSARTAN AND HYDROCHLOROTHIAZIDE 160; 12.5 MG/1; MG/1
TABLET, FILM COATED ORAL
Qty: 180 TABLET | Refills: 1 | Status: SHIPPED | OUTPATIENT
Start: 2022-05-02 | End: 2022-05-16 | Stop reason: ALTCHOICE

## 2022-05-09 ENCOUNTER — TELEPHONE (OUTPATIENT)
Dept: FAMILY MEDICINE CLINIC | Age: 67
End: 2022-05-09

## 2022-05-09 ENCOUNTER — OFFICE VISIT (OUTPATIENT)
Dept: FAMILY MEDICINE CLINIC | Age: 67
End: 2022-05-09

## 2022-05-09 VITALS — DIASTOLIC BLOOD PRESSURE: 70 MMHG | HEART RATE: 55 BPM | SYSTOLIC BLOOD PRESSURE: 100 MMHG

## 2022-05-09 DIAGNOSIS — I21.3 ST ELEVATION MYOCARDIAL INFARCTION (STEMI), UNSPECIFIED ARTERY: Primary | ICD-10-CM

## 2022-05-09 DIAGNOSIS — I10 PRIMARY HYPERTENSION: ICD-10-CM

## 2022-05-09 PROCEDURE — 93000 ELECTROCARDIOGRAM COMPLETE: CPT | Performed by: FAMILY MEDICINE

## 2022-05-09 PROCEDURE — 99214 OFFICE O/P EST MOD 30 MIN: CPT | Performed by: FAMILY MEDICINE

## 2022-05-09 RX ORDER — NITROGLYCERIN 0.4 MG/1
0.4 TABLET SUBLINGUAL
Status: DISCONTINUED | OUTPATIENT
Start: 2022-05-09 | End: 2023-01-04 | Stop reason: SDUPTHER

## 2022-05-09 RX ORDER — ASPIRIN 81 MG/1
324 TABLET, CHEWABLE ORAL ONCE
Status: COMPLETED | OUTPATIENT
Start: 2022-05-09 | End: 2022-05-09

## 2022-05-09 RX ADMIN — NITROGLYCERIN 0.4 MG: 0.4 TABLET SUBLINGUAL at 12:24

## 2022-05-09 RX ADMIN — NITROGLYCERIN 0.4 MG: 0.4 TABLET SUBLINGUAL at 12:48

## 2022-05-09 RX ADMIN — ASPIRIN 324 MG: 81 TABLET, CHEWABLE ORAL at 12:44

## 2022-05-09 NOTE — TELEPHONE ENCOUNTER
Once dr. george OV note was signed off, note was faxed to [facility] at [phone], per dr. george instruction.- clr

## 2022-05-09 NOTE — PROGRESS NOTES
Kendrick Vargas presents to [facility].    Chief Complaint:  Chest pain    Subjective       History of Present Illness:  HPI  Acute onset mid sternal pain, non radiating, 4-5/10, NO N/V, onset 1 hour ago, he was at rest, he then broke out into a sweat, so he drove himself here and EKG was done stat and he is having an anterior infarct.   Started on oxygen  4 baby aspirin at 12:22  NTG 12:24  /79 P 58  2nd NTG 12:31  /70, P 55  No known family history of heart attack.  He is overweight.  He has underlying past medical history of hypertension only.  Cholesterol was excellent at 120.  No previous personal history of heart disease.    Review of Systems:  Review of Systems   Constitutional: Negative for chills and fever.   HENT: Negative for congestion, ear discharge and sore throat.    Respiratory: Negative for shortness of breath and wheezing.    Cardiovascular: Positive for chest pain. Negative for palpitations and leg swelling.   Gastrointestinal: Negative for abdominal pain, constipation, diarrhea, nausea, vomiting and GERD.   Genitourinary: Negative for flank pain.   Neurological: Negative for dizziness and headache.   Psychiatric/Behavioral: Negative for depressed mood.        Objective   Medical History:  Past Medical History:   • Allergic rhinitis, unspecified   • Chronic low back pain   • Essential (primary) hypertension   • Hearing loss, bilateral    wears hearing aids   • BRIELLE on CPAP   • Other insomnia   • Other long term (current) drug therapy   • Testicular hypofunction   • Transient visual loss, bilateral   • Unilateral primary osteoarthritis, right knee     Past Surgical History:   • JOINT REPLACEMENT    sixth surgery   • KNEE SURGERY    Right knee OA. removal of RTKR, deep implant removal   • VASECTOMY      Family History   Problem Relation Age of Onset   • Lung cancer Mother    • Diabetes type II Mother    • Liver cancer Father    • Obesity Son      Social  History     Tobacco Use   • Smoking status: Never Smoker   • Smokeless tobacco: Never Used   • Tobacco comment: Reports chewing 2mg nicotine gum/ 15 per day for 15 years   Substance Use Topics   • Alcohol use: Not Currently       Health Maintenance Due   Topic Date Due   • TDAP/TD VACCINES (1 - Tdap) Never done   • ZOSTER VACCINE (1 of 2) Never done   • HEPATITIS C SCREENING  Never done        Immunization History   Administered Date(s) Administered   • COVID-19 (MODERNA) 1st, 2nd, 3rd Dose Only 03/17/2021, 04/14/2021   • COVID-19 (UNSPECIFIED) 03/17/2021, 04/14/2021, 11/22/2021   • Fluzone High-Dose 65+yrs 12/09/2021   • Pneumococcal Polysaccharide (PPSV23) 04/14/2022       No Known Allergies     Medications:  Current Outpatient Medications on File Prior to Visit   Medication Sig   • meloxicam (MOBIC) 15 MG tablet Take 7.5 mg by mouth Daily.   • Testosterone Cypionate 200 MG/ML solution Inject 1 mL as directed 1 (One) Time Per Week.   • valsartan-hydrochlorothiazide (DIOVAN-HCT) 160-12.5 MG per tablet TAKE 1 TABLET TWICE A DAY   • zolpidem (AMBIEN) 5 MG tablet Take 5 mg by mouth At Night As Needed for Sleep.     No current facility-administered medications on file prior to visit.       Vital Signs:   /79   Pulse 58       Physical Exam:  Physical Exam  Vitals and nursing note reviewed.   Constitutional:       General: He is not in acute distress.     Appearance: Normal appearance. He is not ill-appearing, toxic-appearing or diaphoretic.   HENT:      Head: Normocephalic and atraumatic.      Nose: No congestion or rhinorrhea.      Mouth/Throat:      Mouth: Mucous membranes are moist.      Pharynx: Oropharynx is clear. No oropharyngeal exudate or posterior oropharyngeal erythema.   Eyes:      Extraocular Movements: Extraocular movements intact.      Conjunctiva/sclera: Conjunctivae normal.      Pupils: Pupils are equal, round, and reactive to light.   Cardiovascular:      Rate and Rhythm: Regular rhythm.  Bradycardia present.      Heart sounds: Normal heart sounds.   Pulmonary:      Effort: Pulmonary effort is normal.      Breath sounds: Normal breath sounds. No wheezing, rhonchi or rales.   Abdominal:      General: Abdomen is flat.      Palpations: Abdomen is soft.   Musculoskeletal:      Cervical back: Neck supple. No rigidity.   Lymphadenopathy:      Cervical: No cervical adenopathy.   Skin:     General: Skin is warm and dry.   Neurological:      Mental Status: He is alert and oriented to person, place, and time.   Psychiatric:         Mood and Affect: Mood normal.         Behavior: Behavior normal.         Result Review      The following data was reviewed by Renée Hyman MD on 05/09/2022.  Lab Results   Component Value Date    WBC 6.64 06/14/2021    HGB 16.4 06/14/2021    HCT 48.8 06/14/2021    MCV 94.2 06/14/2021     06/14/2021     Lab Results   Component Value Date    GLUCOSE 102 (H) 12/09/2021    BUN 14 12/09/2021    CREATININE 1.17 12/09/2021    EGFRIFNONA 63 12/09/2021    BCR 12.0 12/09/2021    K 4.6 12/09/2021    CO2 26.8 12/09/2021    CALCIUM 9.4 12/09/2021    ALBUMIN 4.00 06/14/2021    LABIL2 1.1 (L) 01/11/2021    AST 30 06/14/2021    ALT 27 06/14/2021     Lab Results   Component Value Date    CHOL 120 06/14/2021    TRIG 68 06/14/2021    HDL 31 (L) 06/14/2021    LDL 75 06/14/2021     Lab Results   Component Value Date    TSH 3.330 06/14/2021     No results found for: HGBA1C  No results found for: PSA           SCANNED EKG    Date/Time: 5/12/2022 10:15 AM  Performed by: Renée Hyman MD  Authorized by: Swathi De La Cruz APRN       ECG 12 Lead    Date/Time: 5/12/2022 10:19 AM  Performed by: Renée Hyman MD  Authorized by: Renée Hyman MD   Comparison: not compared with previous ECG   Rhythm: sinus rhythm  Rate: bradycardic  Q waves: III    ST Elevation: II, III, aVF and V1  ST Depression: I, aVL, V2 and V5  T elevation: II, III, aVF, V1, V2, V3, V4, V5 and V6  T  inversion: aVR and aVL    Clinical impression: abnormal EKG and myocardial infarction                Assessment and Plan:          Diagnoses and all orders for this visit:    1. ST elevation myocardial infarction (STEMI), unspecified artery (HCC) (Primary)  -     aspirin chewable tablet 324 mg  -     nitroglycerin (NITROSTAT) SL tablet 0.4 mg    2. Primary hypertension      Patient presented with an anterior ST elevation MI on EKG, he was stabilized with oxygen for baby aspirin to nitroglycerin.  Chest pain was 5-6 out of 10 midsternal pressure type pain.  Blood pressure was 130/79.   His blood pressure did drop with the nitroglycerin but he was not having a headache or any dizziness.  When EMS arrived.  His chest pain was a 1 out of 10 when he left.    Follow Up   Return if symptoms worsen or fail to improve.

## 2022-05-10 ENCOUNTER — TELEPHONE (OUTPATIENT)
Dept: FAMILY MEDICINE CLINIC | Age: 67
End: 2022-05-10

## 2022-05-10 NOTE — TELEPHONE ENCOUNTER
Wife said he had a LAD blockage and a stent was place and he was already out of surgery and doing well.

## 2022-05-10 NOTE — TELEPHONE ENCOUNTER
----- Message from TY Ornelas sent at 5/10/2022  7:30 AM EDT -----  Regarding: check on pt  Taken by ambulance to [facility], transferred to [facility], STEMI, had heart cath, stented, admitted, check on him

## 2022-05-11 ENCOUNTER — READMISSION MANAGEMENT (OUTPATIENT)
Dept: CALL CENTER | Facility: HOSPITAL | Age: 67
End: 2022-05-11

## 2022-05-11 ENCOUNTER — TRANSITIONAL CARE MANAGEMENT TELEPHONE ENCOUNTER (OUTPATIENT)
Dept: CALL CENTER | Facility: HOSPITAL | Age: 67
End: 2022-05-11

## 2022-05-11 NOTE — OUTREACH NOTE
Prep Survey    Flowsheet Row Responses   [facility] facility patient discharged from? Non-[facility]   Is LACE score < 7 ? Non-BH Discharge   Emergency Room discharge w/ pulse ox? No   Eligibility [facility]   [facility]   Date of Discharge 05/10/22   Discharge diagnosis LAD blockage and stent placement    Does the patient have one of the following disease processes/diagnoses(primary or secondary)? Other   Prep survey completed? Yes          ADAM DEVLIN - Registered Nurse

## 2022-05-11 NOTE — OUTREACH NOTE
Call Center TCM Note    Flowsheet Row Responses   [facility] patient discharged from? Non-  [[facility] then transferred to [facility]]   Does the patient have one of the following disease processes/diagnoses(primary or secondary)? Other   TCM attempt successful? Yes   Call start time 1431   Call end time 1435   Discharge diagnosis LAD blockage and stent placement    Medication alerts for this patient BRILINTA--bleeding precautions advised as well as reinforced importance of compliance   Meds reviewed with patient/caregiver? Yes   Is the patient having any side effects they believe may be caused by any medication additions or changes? No   Does the patient have all medications ordered at discharge? Yes   Is the patient taking all medications as directed (includes completed medication regime)? Yes   Medication comments Patient started on 2 new BP medications (could not provide name) as well as lipitor, Brilinta also added   Does the patient have a primary care provider?  Yes   Does the patient have an appointment with their PCP within 7 days of discharge? Yes   Comments regarding PCP Hospital PCP FOLLOW UP APPOINTMENT IS 5/16/22@0930am--pt reports he has a call to office and they are working him in for 5/13/22   Has the patient kept scheduled appointments due by today? N/A   Has home health visited the patient within 72 hours of discharge? N/A   Psychosocial issues? No   Nursing interventions --  [Pt has discharged instructions from  he reports--encouraged thorough review]   What is the patient's perception of their health status since discharge? Improving  [Denies any concerns today, no CP, SOA, palpitations.  Reviewed cardiac s/s and need to seek care. Cath site without issues. ]   Is the patient/caregiver able to teach back signs and symptoms related to disease process for when to call PCP? Yes   Is the patient/caregiver able to teach back signs and symptoms related to disease process for when to call 911? Yes    TCM call completed? Yes   Wrap up additional comments Call brief as patient was driving--          Erlinda Barillas, RN    5/11/2022, 14:39 EDT

## 2022-05-11 NOTE — TELEPHONE ENCOUNTER
Pt was d/c yesterday from [facility] and scheduled a hospital f/u for 05/16/22.  Will need TCM call.

## 2022-05-12 NOTE — PROGRESS NOTES
Records requested from [facility]. [facility] notes are already in pts chart under Media- clr

## 2022-05-16 ENCOUNTER — TELEPHONE (OUTPATIENT)
Dept: FAMILY MEDICINE CLINIC | Age: 67
End: 2022-05-16

## 2022-05-16 ENCOUNTER — OFFICE VISIT (OUTPATIENT)
Dept: FAMILY MEDICINE CLINIC | Age: 67
End: 2022-05-16

## 2022-05-16 VITALS
SYSTOLIC BLOOD PRESSURE: 131 MMHG | HEART RATE: 50 BPM | DIASTOLIC BLOOD PRESSURE: 84 MMHG | WEIGHT: 216.8 LBS | BODY MASS INDEX: 34.47 KG/M2

## 2022-05-16 DIAGNOSIS — I25.10 CAD S/P PERCUTANEOUS CORONARY ANGIOPLASTY: ICD-10-CM

## 2022-05-16 DIAGNOSIS — Z13.220 SCREENING, LIPID: ICD-10-CM

## 2022-05-16 DIAGNOSIS — R53.83 OTHER FATIGUE: Primary | ICD-10-CM

## 2022-05-16 DIAGNOSIS — I10 ESSENTIAL HYPERTENSION: Primary | ICD-10-CM

## 2022-05-16 DIAGNOSIS — M25.50 ARTHRALGIA, UNSPECIFIED JOINT: ICD-10-CM

## 2022-05-16 DIAGNOSIS — Z98.61 CAD S/P PERCUTANEOUS CORONARY ANGIOPLASTY: ICD-10-CM

## 2022-05-16 DIAGNOSIS — I21.3 ST ELEVATION MYOCARDIAL INFARCTION (STEMI), UNSPECIFIED ARTERY: ICD-10-CM

## 2022-05-16 PROCEDURE — 1111F DSCHRG MED/CURRENT MED MERGE: CPT | Performed by: NURSE PRACTITIONER

## 2022-05-16 PROCEDURE — 99495 TRANSJ CARE MGMT MOD F2F 14D: CPT | Performed by: NURSE PRACTITIONER

## 2022-05-16 RX ORDER — LISINOPRIL 5 MG/1
40 TABLET ORAL DAILY
COMMUNITY
Start: 2022-05-10

## 2022-05-16 RX ORDER — ASPIRIN 81 MG/1
81 TABLET, CHEWABLE ORAL DAILY
COMMUNITY
Start: 2022-05-10

## 2022-05-16 RX ORDER — METOPROLOL SUCCINATE 25 MG/1
12.5 TABLET, EXTENDED RELEASE ORAL DAILY
COMMUNITY
Start: 2022-05-10

## 2022-05-16 RX ORDER — ATORVASTATIN CALCIUM 40 MG/1
80 TABLET, FILM COATED ORAL DAILY
COMMUNITY
Start: 2022-05-10

## 2022-05-16 NOTE — TELEPHONE ENCOUNTER
Pt said he has an appt to f/u with the cardiologist that placed his stent in 2 or 3 weeks.   Bmp lab due 05/24/22, next lab due in one month. Both orders placed.

## 2022-05-16 NOTE — TELEPHONE ENCOUNTER
Caller: Kendrick Vargas    Relationship to patient: Self    Best call back number: [phone]    Patient is needing: PATIENT WANTED TO LET ELIZABETH KNOW HE HAS A FOLLOW UP WITH HIS CARDIOLOGIST ON 06.20.2022

## 2022-05-16 NOTE — TELEPHONE ENCOUNTER
----- Message from TY Ornelas sent at 5/16/2022 10:31 AM EDT -----  He needs bmp on 5-24-22, send to cardiology, I placed order as well, then place an order for HTN panel one month from then

## 2022-05-16 NOTE — PROGRESS NOTES
Transitional Care Follow Up Visit  Subjective     Kendrick Vargas is a 67 y.o. male who presents for a transitional care management visit.    Within 48 business hours after discharge our office contacted him via telephone to coordinate his care and needs.      I reviewed and discussed the details of that call along with the discharge summary, hospital problems, inpatient lab results, inpatient diagnostic studies, and consultation reports with Kendrick.     Current outpatient and discharge medications have been reconciled for the patient.  Reviewed by: TY Ornelas      Date of TCM Phone Call 5/11/2022   [facility]   Date of Discharge 5/10/2022       Risk for Readmission (LACE) No data recorded    CAD:  FELIC[facility] appt:   Admitted 5-9-22  Discharged : 5-10-22  Current medication: new, ASA 81 mg , Brilinta, Lipitor 40 BID, lisinopril and Toprol XL   Tolerating Medication: Yes, did have some dirrhea at HS for a few days   Condition : improved  Has a follow up with cardiology, has gotten a call from rehab but does not have an appt yet  Symptoms: occ fatigue   BP at home running around 130/82  He has chewed nicorette gum daily for years for energy and has stopped using the gum  Labs:  Lab Results       Component                Value               Date                       GLUCOSE                  102 (H)             12/09/2021                 BUN                      14                  12/09/2021                 CREATININE               1.17                12/09/2021                 EGFRIFNONA               63                  12/09/2021                 BCR                      12.0                12/09/2021                 K                        4.6                 12/09/2021                 CO2                      26.8                12/09/2021                 CALCIUM                  9.4                 12/09/2021                 ALBUMIN                  4.00                06/14/2021                  LABIL2                   1.1 (L)             2021                 AST                      30                  2021                 ALT                      27                  2021              Past Medical History changes since 22 :       Hospitalizations CAD                  hearing loss bilateral, wears hearing aids         Surgical History:     heart cath :     Joint Replacement: RIGHT KNEE;;; 21; sixth surgery;     Vasectomy         Family History:     Father:  at age 73; Cause of death was liver cancer     Mother:  at age 75; Cause of death was lung cancer;  Type 2 Diabetes     Brother(s): 1 brother(s) total; 1 ;  GSW at 30     Sister(s): Healthy; 1 sister(s) total     Son(s): 2 son(s) total;  Obesity     Daughter(s): 1 daughter(s) total         Social History:     Occupation: [employer]. Retired (Prior occupation: teacher/)     Marital Status:  ( and remarried)     Children: 3 children and 2 step-children     Hobbies/Recreation: he enjoys [employer]         Course During Hospital Stay(Copied from D/C Summary and reviewed during encounter on 2022 by TY Ornelas):            Current Outpatient Medications:   •  aspirin 81 MG chewable tablet, Chew 81 mg Daily., Disp: , Rfl:   •  atorvastatin (LIPITOR) 40 MG tablet, Take 80 mg by mouth Daily., Disp: , Rfl:   •  lisinopril (PRINIVIL,ZESTRIL) 5 MG tablet, Take 5 mg by mouth Daily., Disp: , Rfl:   •  MELATONIN PO, Take  by mouth., Disp: , Rfl:   •  metoprolol succinate XL (TOPROL-XL) 25 MG 24 hr tablet, Take 12.5 mg by mouth Daily., Disp: , Rfl:   •  ticagrelor (BRILINTA) 90 MG tablet tablet, Take 90 mg by mouth 2 (Two) Times a Day., Disp: , Rfl:   •  meloxicam (MOBIC) 15 MG tablet, Take 7.5 mg by mouth Daily., Disp: , Rfl:   •  Testosterone Cypionate 200 MG/ML solution, Inject 1 mL as directed 1 (One) Time Per Week., Disp: , Rfl:     Current Facility-Administered  Medications:   •  nitroglycerin (NITROSTAT) SL tablet 0.4 mg, 0.4 mg, Sublingual, Q5 Min PRN, Renée Hyman MD, 0.4 mg at 05/09/22 1248     Vitals:    05/16/22 0940   BP: 131/84   BP Location: Left arm   Patient Position: Sitting   Pulse: 50   Weight: 98.3 kg (216 lb 12.8 oz)       Advance Care Planning     Review of Systems   Constitutional: Negative for fever.   Respiratory: Negative for cough and shortness of breath.    Cardiovascular: Negative for palpitations and leg swelling.   Musculoskeletal: Positive for arthralgias (he was on mobic, was told to hold that rx ).        Objective   Physical Exam  Vitals reviewed.   Constitutional:       General: He is not in acute distress.     Appearance: Normal appearance.   Neck:      Vascular: No carotid bruit.   Cardiovascular:      Rate and Rhythm: Normal rate and regular rhythm.      Heart sounds: Normal heart sounds. No murmur heard.  Pulmonary:      Effort: Pulmonary effort is normal. No respiratory distress.      Breath sounds: Normal breath sounds.   Musculoskeletal:      Right lower leg: No edema.      Left lower leg: No edema.   Neurological:      Mental Status: He is alert.   Psychiatric:         Mood and Affect: Mood normal.         Behavior: Behavior normal.         DATA REVIEWED:              Assessment & Plan     Diagnoses and all orders for this visit:    1. Other fatigue (Primary)  Assessment & Plan:  Contact cardiac rehab for an appt, continue current rx's       2. CAD S/P percutaneous coronary angioplasty  Assessment & Plan:  Follow up with cardiology as directed, they want a BMP 5-24-22, return to our lab for this; to contact rehab for his rehab appt  Stay off nicorette gum  Reviewed labs over last year, to check a HTN panel in one month  Follow a heart healthy diet     Orders:  -     Basic metabolic panel; Future    3. ST elevation myocardial infarction (STEMI), unspecified artery (HCC)  Assessment & Plan:  Reviewed his discharge summary, he has  gone back to work, advised to take off work this week, see cardiology and go to rehab / wait on returning to the gym for his workouts, continue all rx given when he was discharged      4. Arthralgia, unspecified joint  Assessment & Plan:  Stay off mobic, if needed try tylenol arthritis         Follow Up      Return for 5-24-22 for labs then 6 weeks for HTN panel / sooner if needed .

## 2022-05-16 NOTE — ASSESSMENT & PLAN NOTE
Follow up with cardiology as directed, they want a BMP 5-24-22, return to our lab for this; to contact rehab for his rehab appt  Stay off nicorette gum  Reviewed labs over last year, to check a HTN panel in one month  Follow a heart healthy diet

## 2022-05-16 NOTE — ASSESSMENT & PLAN NOTE
Reviewed his discharge summary, he has gone back to work, advised to take off work this week, see cardiology and go to rehab / wait on returning to the gym for his workouts, continue all rx given when he was discharged

## 2022-05-24 ENCOUNTER — LAB (OUTPATIENT)
Dept: LAB | Facility: HOSPITAL | Age: 67
End: 2022-05-24

## 2022-05-24 DIAGNOSIS — I25.10 CAD S/P PERCUTANEOUS CORONARY ANGIOPLASTY: ICD-10-CM

## 2022-05-24 DIAGNOSIS — I10 ESSENTIAL HYPERTENSION: ICD-10-CM

## 2022-05-24 DIAGNOSIS — Z98.61 CAD S/P PERCUTANEOUS CORONARY ANGIOPLASTY: ICD-10-CM

## 2022-05-24 DIAGNOSIS — Z13.220 SCREENING, LIPID: ICD-10-CM

## 2022-05-24 LAB
ALBUMIN SERPL-MCNC: 4.5 G/DL (ref 3.5–5.2)
ALBUMIN/GLOB SERPL: 1.8 G/DL
ALP SERPL-CCNC: 124 U/L (ref 39–117)
ALT SERPL W P-5'-P-CCNC: 29 U/L (ref 1–41)
ANION GAP SERPL CALCULATED.3IONS-SCNC: 8 MMOL/L (ref 5–15)
AST SERPL-CCNC: 22 U/L (ref 1–40)
BILIRUB SERPL-MCNC: 0.8 MG/DL (ref 0–1.2)
BUN SERPL-MCNC: 16 MG/DL (ref 8–23)
BUN/CREAT SERPL: 13 (ref 7–25)
CALCIUM SPEC-SCNC: 9.4 MG/DL (ref 8.6–10.5)
CHLORIDE SERPL-SCNC: 102 MMOL/L (ref 98–107)
CHOLEST SERPL-MCNC: 74 MG/DL (ref 0–200)
CO2 SERPL-SCNC: 27 MMOL/L (ref 22–29)
CREAT SERPL-MCNC: 1.23 MG/DL (ref 0.76–1.27)
EGFRCR SERPLBLD CKD-EPI 2021: 64.7 ML/MIN/1.73
GLOBULIN UR ELPH-MCNC: 2.5 GM/DL
GLUCOSE SERPL-MCNC: 128 MG/DL (ref 65–99)
HDLC SERPL-MCNC: 29 MG/DL (ref 40–60)
LDLC SERPL CALC-MCNC: 27 MG/DL (ref 0–100)
LDLC/HDLC SERPL: 0.92 {RATIO}
POTASSIUM SERPL-SCNC: 4.3 MMOL/L (ref 3.5–5.2)
PROT SERPL-MCNC: 7 G/DL (ref 6–8.5)
SODIUM SERPL-SCNC: 137 MMOL/L (ref 136–145)
TRIGL SERPL-MCNC: 92 MG/DL (ref 0–150)
VLDLC SERPL-MCNC: 18 MG/DL (ref 5–40)

## 2022-05-24 PROCEDURE — 80061 LIPID PANEL: CPT

## 2022-05-24 PROCEDURE — 80053 COMPREHEN METABOLIC PANEL: CPT

## 2022-05-24 PROCEDURE — 36415 COLL VENOUS BLD VENIPUNCTURE: CPT

## 2022-10-24 ENCOUNTER — OFFICE VISIT (OUTPATIENT)
Dept: FAMILY MEDICINE CLINIC | Age: 67
End: 2022-10-24

## 2022-10-24 VITALS
SYSTOLIC BLOOD PRESSURE: 136 MMHG | HEART RATE: 61 BPM | DIASTOLIC BLOOD PRESSURE: 85 MMHG | HEIGHT: 67 IN | OXYGEN SATURATION: 97 % | BODY MASS INDEX: 34.47 KG/M2

## 2022-10-24 DIAGNOSIS — I21.3 ST ELEVATION MYOCARDIAL INFARCTION (STEMI), UNSPECIFIED ARTERY: Primary | ICD-10-CM

## 2022-10-24 PROCEDURE — 99213 OFFICE O/P EST LOW 20 MIN: CPT | Performed by: NURSE PRACTITIONER

## 2022-10-24 RX ORDER — ZOLPIDEM TARTRATE 5 MG/1
0.5 TABLET ORAL
COMMUNITY
Start: 2022-05-09

## 2022-10-24 RX ORDER — ACETAMINOPHEN 500 MG
500 TABLET ORAL
COMMUNITY

## 2022-10-24 RX ORDER — NITROGLYCERIN 0.4 MG/1
0.4 TABLET SUBLINGUAL
Status: SHIPPED | OUTPATIENT
Start: 2022-10-24

## 2022-10-24 RX ADMIN — NITROGLYCERIN 0.4 MG: 0.4 TABLET SUBLINGUAL at 11:45

## 2022-10-24 NOTE — PROGRESS NOTES
"Chief Complaint  Chest Pain    Subjective        Kendrick Vargas presents to [facility]  Fatigue  This is a recurrent problem. The current episode started today. The problem occurs constantly. Associated symptoms include chest pain and fatigue. Nothing aggravates the symptoms. He has tried NSAIDs for the symptoms. The treatment provided no relief.       Objective   Vital Signs:  /85 (BP Location: Left arm, Patient Position: Sitting, Cuff Size: Large Adult)   Pulse 61   Ht 168.9 cm (66.5\")   SpO2 97%   BMI 34.47 kg/m²   Estimated body mass index is 34.47 kg/m² as calculated from the following:    Height as of this encounter: 168.9 cm (66.5\").    Weight as of 5/16/22: 98.3 kg (216 lb 12.8 oz).          Physical Exam  Constitutional:       Appearance: He is diaphoretic.   HENT:      Head: Normocephalic.   Cardiovascular:      Rate and Rhythm: Normal rate.   Pulmonary:      Effort: Pulmonary effort is normal. No respiratory distress.      Breath sounds: Normal breath sounds. No stridor. No wheezing, rhonchi or rales.   Skin:     General: Skin is warm.   Neurological:      Mental Status: He is alert and oriented to person, place, and time.   Psychiatric:         Mood and Affect: Mood normal.        Result Review :{Labs  Result Review  Imaging  Med Tab  Media  Procedures  :23}                Assessment and Plan   Diagnoses and all orders for this visit:    1. ST elevation myocardial infarction (STEMI), unspecified artery (HCC) (Primary)  Comments:  EKG changes, sent to ER via EMS for further workup  Orders:  -     nitroglycerin (NITROSTAT) SL tablet 0.4 mg             Follow Up   Return if symptoms worsen or fail to improve.  Patient was given instructions and counseling regarding his condition or for health maintenance advice. Please see specific information pulled into the AVS if appropriate.       "

## 2022-12-27 ENCOUNTER — CLINICAL SUPPORT (OUTPATIENT)
Dept: FAMILY MEDICINE CLINIC | Age: 67
End: 2022-12-27

## 2022-12-27 VITALS — SYSTOLIC BLOOD PRESSURE: 152 MMHG | DIASTOLIC BLOOD PRESSURE: 86 MMHG | HEART RATE: 53 BPM

## 2022-12-29 ENCOUNTER — CLINICAL SUPPORT (OUTPATIENT)
Dept: FAMILY MEDICINE CLINIC | Age: 67
End: 2022-12-29

## 2022-12-29 VITALS — SYSTOLIC BLOOD PRESSURE: 151 MMHG | DIASTOLIC BLOOD PRESSURE: 84 MMHG | HEART RATE: 48 BPM

## 2023-01-04 ENCOUNTER — OFFICE VISIT (OUTPATIENT)
Dept: FAMILY MEDICINE CLINIC | Age: 68
End: 2023-01-04
Payer: MEDICARE

## 2023-01-04 VITALS
DIASTOLIC BLOOD PRESSURE: 74 MMHG | HEART RATE: 53 BPM | SYSTOLIC BLOOD PRESSURE: 117 MMHG | RESPIRATION RATE: 18 BRPM | HEIGHT: 67 IN | BODY MASS INDEX: 30.07 KG/M2 | WEIGHT: 191.6 LBS

## 2023-01-04 DIAGNOSIS — F41.3 OTHER MIXED ANXIETY DISORDERS: ICD-10-CM

## 2023-01-04 DIAGNOSIS — I10 ESSENTIAL HYPERTENSION: Primary | ICD-10-CM

## 2023-01-04 DIAGNOSIS — R53.83 OTHER FATIGUE: ICD-10-CM

## 2023-01-04 DIAGNOSIS — Z98.61 CAD S/P PERCUTANEOUS CORONARY ANGIOPLASTY: ICD-10-CM

## 2023-01-04 DIAGNOSIS — I25.10 CAD S/P PERCUTANEOUS CORONARY ANGIOPLASTY: ICD-10-CM

## 2023-01-04 DIAGNOSIS — E78.2 MIXED HYPERLIPIDEMIA: ICD-10-CM

## 2023-01-04 DIAGNOSIS — E29.1 TESTOSTERONE DEFICIENCY IN MALE: ICD-10-CM

## 2023-01-04 PROCEDURE — 3074F SYST BP LT 130 MM HG: CPT | Performed by: NURSE PRACTITIONER

## 2023-01-04 PROCEDURE — 99214 OFFICE O/P EST MOD 30 MIN: CPT | Performed by: NURSE PRACTITIONER

## 2023-01-04 PROCEDURE — 3078F DIAST BP <80 MM HG: CPT | Performed by: NURSE PRACTITIONER

## 2023-01-04 PROCEDURE — 1159F MED LIST DOCD IN RCRD: CPT | Performed by: NURSE PRACTITIONER

## 2023-01-04 PROCEDURE — 1160F RVW MEDS BY RX/DR IN RCRD: CPT | Performed by: NURSE PRACTITIONER

## 2023-01-04 RX ORDER — CITALOPRAM 20 MG/1
20 TABLET ORAL DAILY
COMMUNITY

## 2023-01-04 NOTE — ASSESSMENT & PLAN NOTE
He will return fasting for labs, to take 40 mg of lisinopril and stay on same dose he has been on of toprol, monitor his bp, keep his upcoming appt with cardiology; his son has taken over coaching for now and he thinks that may also have helped   As he was leaving he reported he chews nicorette gum, advised to taper off nicorette gum.

## 2023-01-04 NOTE — PROGRESS NOTES
Kendrick Vargas presents to [facility].    Chief Complaint:  Hypertension, Hyperlipidemia, and Follow-up         History of Present Illness:  Hyperlipidemia  Current medication: lipitor   Tolerating medication: Yes  Needs Refill: No    Lab Results       Component                Value               Date                       CHOL                     74                  05/24/2022                 TRIG                     92                  05/24/2022                 HDL                      29 (L)              05/24/2022                 LDL                      27                  05/24/2022              Hypertension:  Current medication: lisinopril 20 mg, he is taking 60 mg for 5 days and toprol XL 12.5 mg (25 mg half a pill daily )  Tolerating Medication: Yes  Checking BP at home and it is: 170/90 during daphne then 150//90 but better now since increasing his rx dose per Dr Hernandez's recommendation       Labs:  Lab Results       Component                Value               Date                       GLUCOSE                  128 (H)             05/24/2022                 BUN                      16                  05/24/2022                 CREATININE               1.23                05/24/2022                 EGFRIFNONA               63                  12/09/2021                 BCR                      13.0                05/24/2022                 K                        4.3                 05/24/2022                 CO2                      27.0                05/24/2022                 CALCIUM                  9.4                 05/24/2022                 ALBUMIN                  4.50                05/24/2022                 LABIL2                   1.1 (L)             01/11/2021                 AST                      22                  05/24/2022                 ALT                      29                  05/24/2022                Dr Hernandez put him on celexa in ,  but he has only been taking  for 10 days   He has been fatigued and down over the last month.  He started coaching basket ball and then the symptoms of fatigue, depression, anorexia have hit him,  He is feeling better now.  He did take a zpack and steroid from [facility] last month but stopped the steroid.  He has had blurred vision for a year and is trying to see an optometrist for an exam but has not been in yet. Was on testosterone but when he had his MI he had to stop.       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 Negative for fever.   HENT: Positive for congestion (did have sinus symptoms and [facility] treated him with zpack and steroids, which he stopped ). Dental problem: cbc.    Eyes: Positive for blurred vision (for a year ).   Respiratory: Negative for cough and shortness of breath.    Cardiovascular: Negative for chest pain, palpitations and leg swelling.   Gastrointestinal:        Anorexia    Neurological: Negative for numbness.   Psychiatric/Behavioral:        Was feeling down during daphne           Current Outpatient Medications:   •   acetaminophen (TYLENOL) 500 MG tablet, Take 1 tablet by mouth., Disp: , Rfl:   •  aspirin 81 MG chewable tablet, Chew 81 mg Daily., Disp: , Rfl:   •  atorvastatin (LIPITOR) 40 MG tablet, Take 80 mg by mouth Daily., Disp: , Rfl:   •  citalopram (CeleXA) 20 MG tablet, Take 20 mg by mouth Daily., Disp: , Rfl:   •  lisinopril (PRINIVIL,ZESTRIL) 5 MG tablet, Take 40 mg by mouth Daily., Disp: , Rfl:   •  MELATONIN PO, Take  by mouth., Disp: , Rfl:   •  meloxicam (MOBIC) 15 MG tablet, Take 15 mg by mouth Daily., Disp: , Rfl:   •  metoprolol succinate XL (TOPROL-XL) 25 MG 24 hr tablet, Take 12.5 mg by mouth Daily., Disp: , Rfl:   •  ticagrelor (BRILINTA) 90 MG tablet tablet, Take 90 mg by mouth 2 (Two) Times a Day., Disp: , Rfl:   •  zolpidem (AMBIEN) 5 MG tablet, Take 0.5 tablets by mouth., Disp: , Rfl:     Current Facility-Administered Medications:   •  nitroglycerin (NITROSTAT) SL tablet 0.4 mg, 0.4 mg, Sublingual, Q5 Min PRN, Swathi Park APRN, 0.4 mg at 10/24/22 1145    Vital Signs:   Vitals:    01/04/23 1004   BP: 117/74   BP Location: Right arm   Patient Position: Sitting   Cuff Size: Adult   Pulse: 53   Resp: 18   Weight: 86.9 kg (191 lb 9.6 oz)   Height: 168.9 cm (66.5\")   PainSc: 0-No pain         Physical Exam:  Physical Exam  Vitals reviewed.   Constitutional:       General: He is not in acute distress.     Appearance: Normal appearance.   Neck:      Vascular: No carotid bruit.   Cardiovascular:      Rate and Rhythm: Normal rate and regular rhythm.      Heart sounds: Normal heart sounds. No murmur heard.  Pulmonary:      Effort: Pulmonary effort is normal. No respiratory distress.      Breath sounds: Normal breath sounds.   Musculoskeletal:      Right lower leg: No edema.      Left lower leg: No edema.   Neurological:      Mental Status: He is alert.   Psychiatric:         Mood and Affect: Mood normal.         Behavior: Behavior normal.         Result Review      The following data was reviewed by: Swathi  TY Grimes on 01/04/2023:    Results for orders placed or performed in visit on 05/24/22   Comprehensive metabolic panel    Specimen: Blood   Result Value Ref Range    Glucose 128 (H) 65 - 99 mg/dL    BUN 16 8 - 23 mg/dL    Creatinine 1.23 0.76 - 1.27 mg/dL    Sodium 137 136 - 145 mmol/L    Potassium 4.3 3.5 - 5.2 mmol/L    Chloride 102 98 - 107 mmol/L    CO2 27.0 22.0 - 29.0 mmol/L    Calcium 9.4 8.6 - 10.5 mg/dL    Total Protein 7.0 6.0 - 8.5 g/dL    Albumin 4.50 3.50 - 5.20 g/dL    ALT (SGPT) 29 1 - 41 U/L    AST (SGOT) 22 1 - 40 U/L    Alkaline Phosphatase 124 (H) 39 - 117 U/L    Total Bilirubin 0.8 0.0 - 1.2 mg/dL    Globulin 2.5 gm/dL    A/G Ratio 1.8 g/dL    BUN/Creatinine Ratio 13.0 7.0 - 25.0    Anion Gap 8.0 5.0 - 15.0 mmol/L    eGFR 64.7 >60.0 mL/min/1.73   Lipid panel    Specimen: Blood   Result Value Ref Range    Total Cholesterol 74 0 - 200 mg/dL    Triglycerides 92 0 - 150 mg/dL    HDL Cholesterol 29 (L) 40 - 60 mg/dL    LDL Cholesterol  27 0 - 100 mg/dL    VLDL Cholesterol 18 5 - 40 mg/dL    LDL/HDL Ratio 0.92                Assessment and Plan:          Diagnoses and all orders for this visit:    1. Essential hypertension (Primary)  Assessment & Plan:  He will return fasting for labs, to take 40 mg of lisinopril and stay on same dose he has been on of toprol, monitor his bp, keep his upcoming appt with cardiology; his son has taken over coaching for now and he thinks that may also have helped   As he was leaving he reported he chews nicorette gum, advised to taper off nicorette gum.      Orders:  -     Comprehensive Metabolic Panel; Future  -     Lipid Panel; Future    2. Other fatigue  Assessment & Plan:  Return to lab fasting     Orders:  -     Vitamin B12; Future  -     Folate; Future  -     CBC w AUTO Differential; Future  -     TSH Rfx On Abnormal To Free T4; Future  -     Testosterone, Free, Total; Future    3. Testosterone deficiency in male  Assessment & Plan:  He will return  tomorrow for fasting labs       4. Other mixed anxiety disorders  Assessment & Plan:  To continue taking Celexa as directed       5. CAD S/P percutaneous coronary angioplasty  Assessment & Plan:  Continue brilinta and lipitor as recommended by his cardiologist       6. Mixed hyperlipidemia  Assessment & Plan:  Continue lipitor and follow up fasting           Follow Up   Return for fasting for labs.  Patient was given instructions and counseling regarding his condition or for health maintenance advice. Please see specific information pulled into the AVS if appropriate.

## 2023-01-05 ENCOUNTER — LAB (OUTPATIENT)
Dept: LAB | Facility: HOSPITAL | Age: 68
End: 2023-01-05
Payer: MEDICARE

## 2023-01-05 ENCOUNTER — TELEPHONE (OUTPATIENT)
Dept: FAMILY MEDICINE CLINIC | Age: 68
End: 2023-01-05
Payer: MEDICARE

## 2023-01-05 DIAGNOSIS — R53.83 OTHER FATIGUE: ICD-10-CM

## 2023-01-05 DIAGNOSIS — Z12.11 SCREEN FOR COLON CANCER: Primary | ICD-10-CM

## 2023-01-05 DIAGNOSIS — I10 ESSENTIAL HYPERTENSION: ICD-10-CM

## 2023-01-05 LAB
ALBUMIN SERPL-MCNC: 4.5 G/DL (ref 3.5–5.2)
ALBUMIN/GLOB SERPL: 2 G/DL
ALP SERPL-CCNC: 129 U/L (ref 39–117)
ALT SERPL W P-5'-P-CCNC: 27 U/L (ref 1–41)
ANION GAP SERPL CALCULATED.3IONS-SCNC: 8 MMOL/L (ref 5–15)
AST SERPL-CCNC: 20 U/L (ref 1–40)
BASOPHILS # BLD AUTO: 0.02 10*3/MM3 (ref 0–0.2)
BASOPHILS NFR BLD AUTO: 0.3 % (ref 0–1.5)
BILIRUB SERPL-MCNC: 0.6 MG/DL (ref 0–1.2)
BUN SERPL-MCNC: 14 MG/DL (ref 8–23)
BUN/CREAT SERPL: 13.3 (ref 7–25)
CALCIUM SPEC-SCNC: 9.5 MG/DL (ref 8.6–10.5)
CHLORIDE SERPL-SCNC: 100 MMOL/L (ref 98–107)
CHOLEST SERPL-MCNC: 92 MG/DL (ref 0–200)
CO2 SERPL-SCNC: 29 MMOL/L (ref 22–29)
CREAT SERPL-MCNC: 1.05 MG/DL (ref 0.76–1.27)
DEPRECATED RDW RBC AUTO: 48.2 FL (ref 37–54)
EGFRCR SERPLBLD CKD-EPI 2021: 78.3 ML/MIN/1.73
EOSINOPHIL # BLD AUTO: 0.16 10*3/MM3 (ref 0–0.4)
EOSINOPHIL NFR BLD AUTO: 2.3 % (ref 0.3–6.2)
ERYTHROCYTE [DISTWIDTH] IN BLOOD BY AUTOMATED COUNT: 13.4 % (ref 12.3–15.4)
FOLATE SERPL-MCNC: 7.35 NG/ML (ref 4.78–24.2)
GLOBULIN UR ELPH-MCNC: 2.3 GM/DL
GLUCOSE SERPL-MCNC: 111 MG/DL (ref 65–99)
HCT VFR BLD AUTO: 43.9 % (ref 37.5–51)
HDLC SERPL-MCNC: 50 MG/DL (ref 40–60)
HGB BLD-MCNC: 14.6 G/DL (ref 13–17.7)
IMM GRANULOCYTES # BLD AUTO: 0.04 10*3/MM3 (ref 0–0.05)
IMM GRANULOCYTES NFR BLD AUTO: 0.6 % (ref 0–0.5)
LDLC SERPL CALC-MCNC: 31 MG/DL (ref 0–100)
LDLC/HDLC SERPL: 0.69 {RATIO}
LYMPHOCYTES # BLD AUTO: 2.33 10*3/MM3 (ref 0.7–3.1)
LYMPHOCYTES NFR BLD AUTO: 33.1 % (ref 19.6–45.3)
MCH RBC QN AUTO: 32.3 PG (ref 26.6–33)
MCHC RBC AUTO-ENTMCNC: 33.3 G/DL (ref 31.5–35.7)
MCV RBC AUTO: 97.1 FL (ref 79–97)
MONOCYTES # BLD AUTO: 0.53 10*3/MM3 (ref 0.1–0.9)
MONOCYTES NFR BLD AUTO: 7.5 % (ref 5–12)
NEUTROPHILS NFR BLD AUTO: 3.96 10*3/MM3 (ref 1.7–7)
NEUTROPHILS NFR BLD AUTO: 56.2 % (ref 42.7–76)
PLATELET # BLD AUTO: 207 10*3/MM3 (ref 140–450)
PMV BLD AUTO: 10.2 FL (ref 6–12)
POTASSIUM SERPL-SCNC: 4.7 MMOL/L (ref 3.5–5.2)
PROT SERPL-MCNC: 6.8 G/DL (ref 6–8.5)
RBC # BLD AUTO: 4.52 10*6/MM3 (ref 4.14–5.8)
SODIUM SERPL-SCNC: 137 MMOL/L (ref 136–145)
TRIGL SERPL-MCNC: 38 MG/DL (ref 0–150)
TSH SERPL DL<=0.05 MIU/L-ACNC: 2.51 UIU/ML (ref 0.27–4.2)
VIT B12 BLD-MCNC: 402 PG/ML (ref 211–946)
VLDLC SERPL-MCNC: 11 MG/DL (ref 5–40)
WBC NRBC COR # BLD: 7.04 10*3/MM3 (ref 3.4–10.8)

## 2023-01-05 PROCEDURE — 84402 ASSAY OF FREE TESTOSTERONE: CPT

## 2023-01-05 PROCEDURE — 80053 COMPREHEN METABOLIC PANEL: CPT

## 2023-01-05 PROCEDURE — 82746 ASSAY OF FOLIC ACID SERUM: CPT

## 2023-01-05 PROCEDURE — 82607 VITAMIN B-12: CPT

## 2023-01-05 PROCEDURE — 36415 COLL VENOUS BLD VENIPUNCTURE: CPT

## 2023-01-05 PROCEDURE — 84403 ASSAY OF TOTAL TESTOSTERONE: CPT

## 2023-01-05 PROCEDURE — 85025 COMPLETE CBC W/AUTO DIFF WBC: CPT

## 2023-01-05 PROCEDURE — 80061 LIPID PANEL: CPT

## 2023-01-05 PROCEDURE — 84443 ASSAY THYROID STIM HORMONE: CPT

## 2023-01-05 NOTE — TELEPHONE ENCOUNTER
Pt said Swathi Jono wanted to send him for a colonoscopy and he had the name and number for the person he wants to see. It is Ander Muniz ([facility]) fax # [phone]

## 2023-01-08 LAB
TESTOST FREE SERPL-MCNC: 4.8 PG/ML (ref 6.6–18.1)
TESTOST SERPL-MCNC: 354 NG/DL (ref 264–916)

## 2023-01-09 DIAGNOSIS — R73.9 ELEVATED BLOOD SUGAR: Primary | ICD-10-CM

## 2023-01-10 ENCOUNTER — CLINICAL SUPPORT (OUTPATIENT)
Dept: FAMILY MEDICINE CLINIC | Age: 68
End: 2023-01-10
Payer: MEDICARE

## 2023-01-10 ENCOUNTER — LAB (OUTPATIENT)
Dept: LAB | Facility: HOSPITAL | Age: 68
End: 2023-01-10
Payer: MEDICARE

## 2023-01-10 VITALS — HEART RATE: 44 BPM | DIASTOLIC BLOOD PRESSURE: 73 MMHG | SYSTOLIC BLOOD PRESSURE: 128 MMHG

## 2023-01-10 DIAGNOSIS — R73.9 ELEVATED BLOOD SUGAR: ICD-10-CM

## 2023-01-10 LAB — HBA1C MFR BLD: 5.8 % (ref 4.8–5.6)

## 2023-01-10 PROCEDURE — 36415 COLL VENOUS BLD VENIPUNCTURE: CPT

## 2023-01-10 PROCEDURE — 83036 HEMOGLOBIN GLYCOSYLATED A1C: CPT

## 2023-01-10 NOTE — Clinical Note
B/P Check and Pulse. Please notice the result of pt's pulse. I checked it twice. Pt was feeling fine./pr

## 2023-01-10 NOTE — PROGRESS NOTES
That was his lisinopril that he increased but now is back at a lower dose.  The beta blocker lowers heart rate, he needs to keep an eye on this.  He may need to see his cardiologist sooner if there is an issue. Will wait on his A1C results first.

## 2023-01-10 NOTE — PROGRESS NOTES
/73 (BP Location: Left arm, Patient Position: Sitting)  Pulse 44   He is on Lisinopril and toprol XL 12.5 mg daily  He was chewing nicorette gum, I told him to cut back  His bp is good.    His pulse is running a little low now, clarify that he is just taking toprol XL 12.5 mg daily, see how he is doing, his A1C is still pending.  He was here to get that lab today

## 2023-03-07 ENCOUNTER — TELEPHONE (OUTPATIENT)
Dept: FAMILY MEDICINE CLINIC | Age: 68
End: 2023-03-07
Payer: MEDICARE

## 2023-03-07 RX ORDER — LISINOPRIL 5 MG/1
40 TABLET ORAL DAILY
Status: CANCELLED | OUTPATIENT
Start: 2023-03-07

## 2023-03-07 RX ORDER — ZOLPIDEM TARTRATE 5 MG/1
2.5 TABLET ORAL
Status: CANCELLED | OUTPATIENT
Start: 2023-03-07

## 2023-03-07 NOTE — TELEPHONE ENCOUNTER
Rx Refill Note  Requested Prescriptions     Pending Prescriptions Disp Refills   • lisinopril (PRINIVIL,ZESTRIL) 5 MG tablet       Sig: Take 8 tablets by mouth Daily.   • zolpidem (AMBIEN) 5 MG tablet       Sig: Take 0.5 tablets by mouth.      Last office visit with prescribing clinician: 1/4/2023   Last telemedicine visit with prescribing clinician: 4/20/2023   Next office visit with prescribing clinician: 4/20/2023  No urine drug screen found.     Called [facility] to see who refilled these last and for the count.  These were sent in last by Dr Hernandez.    Ambien 10mg, take 1/2- 1 at hs as needed on 01/25/23.  Lisinopril 20mg, take 1 1/2 tablets a day to equal 30mg.  Last sent 02/15/23, 30 day supply.     Talked to pt and he said Dr Hernandez had him taking two of the Lisinopril 20 mg daily.  Because his b/p went up.     Florencia Donovan, LPN  03/07/23, 09:43 EST

## 2023-03-07 NOTE — TELEPHONE ENCOUNTER
See note below.  I call [facility] and got the strengths corrected.  Then I talked to the pt. He said he was taking it for his b/p and Dr Hernandez increased it because his b/p went up.

## 2023-03-07 NOTE — TELEPHONE ENCOUNTER
"Caller: Kendrick Vargas \"Esteban\"    Relationship: Self    Best call back number: [phone]    Requested Prescriptions:   Requested Prescriptions     Pending Prescriptions Disp Refills   • lisinopril (PRINIVIL,ZESTRIL) 5 MG tablet       Sig: Take 8 tablets by mouth Daily.   • zolpidem (AMBIEN) 5 MG tablet       Sig: Take 0.5 tablets by mouth.        Pharmacy where request should be sent: [facility] [address] [phone] [facility] [phone] FX     Does the patient have less than a 3 day supply:  [x] Yes  [] No    Would you like a call back once the refill request has been completed: [] Yes [x] No    If the office needs to give you a call back, can they leave a voicemail: [] Yes [x] No    Cheri Romo, PCT   03/07/23 08:16 EST           "

## 2023-03-09 NOTE — TELEPHONE ENCOUNTER
Have him come by and get his BP checked and then I can get his rx sent in correctly, can then decide on lisinopril 20, 30 or 40 mg and not taking 5 mg pills and taking 8.

## 2023-03-22 ENCOUNTER — CLINICAL SUPPORT (OUTPATIENT)
Dept: FAMILY MEDICINE CLINIC | Age: 68
End: 2023-03-22
Payer: MEDICARE

## 2023-03-22 VITALS — HEART RATE: 40 BPM | DIASTOLIC BLOOD PRESSURE: 82 MMHG | SYSTOLIC BLOOD PRESSURE: 147 MMHG

## 2023-03-23 ENCOUNTER — TELEPHONE (OUTPATIENT)
Dept: FAMILY MEDICINE CLINIC | Age: 68
End: 2023-03-23
Payer: MEDICARE

## 2023-03-23 NOTE — TELEPHONE ENCOUNTER
Spoke with patient about his pulse beening low and patient ask me to call his cardio for an appt. Advised I will do this and let him know time and date

## 2023-03-23 NOTE — TELEPHONE ENCOUNTER
----- Message from TY Ornelas sent at 3/23/2023  8:50 AM EDT -----    Author: Swathi De La Cruz APRN Service: -- Author Type: Nurse Practitioner  Filed: 03/23/23 0851 Encounter Date: 3/22/2023 Status: Signed  : Swathi De La Cruz APRN (Nurse Practitioner)      /82 (BP Location: Left arm, Patient Position: Sitting)  Pulse 40 Important   He is on Lisinopril and Toprol XL, have him see his cardiologist, he was a pt of [facility], I have also been seeing him.       ----- Message -----  From: Latanya Bean LPN  Sent: 3/22/2023  11:29 AM EDT  To: TY Ornelas    Here for free bp check, taking Lisinopril 20mg, 2 tabs daily

## 2023-03-23 NOTE — TELEPHONE ENCOUNTER
Pt inf, Patient see Dr Roman for Cardio. Pt states he will and make an appt with him. If any issue with the appt patient will call back

## 2023-03-23 NOTE — PROGRESS NOTES
/82 (BP Location: Left arm, Patient Position: Sitting)  Pulse 40 Important   He is on Lisinopril and Toprol XL, have him see his cardiologist, he was a pt of [facility], I have also been seeing him.

## 2023-03-23 NOTE — TELEPHONE ENCOUNTER
And you explained that his pulse was running low, I would like to be cautious with this patient and make sure his BP/pulse are controlled and he is on the best rx's for him.?

## 2023-04-11 ENCOUNTER — OFFICE VISIT (OUTPATIENT)
Dept: FAMILY MEDICINE CLINIC | Age: 68
End: 2023-04-11
Payer: MEDICARE

## 2023-04-11 VITALS
HEART RATE: 54 BPM | OXYGEN SATURATION: 98 % | HEIGHT: 66 IN | BODY MASS INDEX: 30.47 KG/M2 | DIASTOLIC BLOOD PRESSURE: 91 MMHG | SYSTOLIC BLOOD PRESSURE: 150 MMHG

## 2023-04-11 DIAGNOSIS — I25.10 CAD S/P PERCUTANEOUS CORONARY ANGIOPLASTY: ICD-10-CM

## 2023-04-11 DIAGNOSIS — R20.0 NUMBNESS AND TINGLING IN BOTH HANDS: Primary | ICD-10-CM

## 2023-04-11 DIAGNOSIS — R00.1 BRADYCARDIA: ICD-10-CM

## 2023-04-11 DIAGNOSIS — R20.2 NUMBNESS AND TINGLING IN BOTH HANDS: Primary | ICD-10-CM

## 2023-04-11 DIAGNOSIS — I10 ESSENTIAL HYPERTENSION: ICD-10-CM

## 2023-04-11 DIAGNOSIS — Z98.61 CAD S/P PERCUTANEOUS CORONARY ANGIOPLASTY: ICD-10-CM

## 2023-04-11 RX ORDER — ATORVASTATIN CALCIUM 80 MG/1
TABLET, FILM COATED ORAL
COMMUNITY
Start: 2023-03-20 | End: 2023-04-13 | Stop reason: SDUPTHER

## 2023-04-11 RX ORDER — AMLODIPINE BESYLATE 5 MG/1
5 TABLET ORAL DAILY
COMMUNITY
Start: 2023-04-05

## 2023-04-11 RX ORDER — CLOPIDOGREL BISULFATE 75 MG/1
75 TABLET ORAL DAILY
COMMUNITY
Start: 2023-03-29

## 2023-04-11 RX ORDER — VALSARTAN 320 MG/1
320 TABLET ORAL DAILY
COMMUNITY
Start: 2023-04-05

## 2023-04-11 NOTE — PROGRESS NOTES
Subjective     CHIEF COMPLAINT    Chief Complaint   Patient presents with   • Chest Pain     History of Present Illness  Patient is a 68 year old male who presents to the clinic today concerned that he may be having a heart attack.  He has had a heart attack in the past.  His cardiologist is Dr. Roman  He had a stent placed in May of last year.  Today while he was at work he describes a feeling of being hot and tingling in both hands and upper extremities going numb.  This is exactly how his last heart attack felt.  He does not specifically complain of any chest pain at this time.  Reports that he just had a Holter monitor taken off yesterday and returned to Dr. Roman's office.    Review of Systems   Respiratory: Negative for shortness of breath and wheezing.    Cardiovascular: Negative for chest pain and palpitations.   Neurological: Positive for weakness and numbness.       No Known Allergies      Current Outpatient Medications on File Prior to Visit   Medication Sig Dispense Refill   • acetaminophen (TYLENOL) 500 MG tablet Take 1 tablet by mouth.     • amLODIPine (NORVASC) 5 MG tablet      • aspirin 81 MG chewable tablet Chew 81 mg Daily.     • atorvastatin (LIPITOR) 40 MG tablet Take 80 mg by mouth Daily.     • atorvastatin (LIPITOR) 80 MG tablet      • citalopram (CeleXA) 20 MG tablet Take 20 mg by mouth Daily.     • clopidogrel (PLAVIX) 75 MG tablet      • lisinopril (PRINIVIL,ZESTRIL) 5 MG tablet Take 40 mg by mouth Daily.     • MELATONIN PO Take  by mouth.     • meloxicam (MOBIC) 15 MG tablet Take 15 mg by mouth Daily.     • metoprolol succinate XL (TOPROL-XL) 25 MG 24 hr tablet Take 12.5 mg by mouth Daily.     • ticagrelor (BRILINTA) 90 MG tablet tablet Take 90 mg by mouth 2 (Two) Times a Day.     • valsartan (DIOVAN) 320 MG tablet      • zolpidem (AMBIEN) 5 MG tablet Take 0.5 tablets by mouth.       Current Facility-Administered Medications on File Prior to Visit   Medication Dose Route Frequency  "Provider Last Rate Last Admin   • nitroglycerin (NITROSTAT) SL tablet 0.4 mg  0.4 mg Sublingual Q5 Min PRN Swathi Park APRN   0.4 mg at 10/24/22 1145     /91 (BP Location: Left arm, Patient Position: Sitting, Cuff Size: Adult)   Pulse 54   Ht 168.9 cm (66.5\")   SpO2 98%   BMI 30.47 kg/m²     Objective     Physical Exam  Vitals and nursing note reviewed.   Constitutional:       General: He is not in acute distress.     Appearance: Normal appearance. He is not ill-appearing.   HENT:      Head: Normocephalic.      Mouth/Throat:      Lips: Pink.   Eyes:      Extraocular Movements: Extraocular movements intact.   Cardiovascular:      Rate and Rhythm: Regular rhythm. Bradycardia present.      Heart sounds: Normal heart sounds. No murmur heard.  Pulmonary:      Effort: Pulmonary effort is normal. No accessory muscle usage or respiratory distress.      Breath sounds: Normal breath sounds. No wheezing or rhonchi.   Neurological:      General: No focal deficit present.      Mental Status: He is alert and oriented to person, place, and time.      GCS: GCS eye subscore is 4. GCS verbal subscore is 5. GCS motor subscore is 6.      Cranial Nerves: No facial asymmetry.      Gait: Gait is intact.   Psychiatric:         Mood and Affect: Mood and affect normal.         Behavior: Behavior normal.             Diagnoses and all orders for this visit:    1. Numbness and tingling in both hands (Primary)  -     ECG 12 Lead    2. CAD S/P percutaneous coronary angioplasty  -     ECG 12 Lead    3. Bradycardia  -     ECG 12 Lead    4. Essential hypertension      EKG completed in office today, reviewed with on-call provider, Dr. Christina.  Given patient's history and that he reports his symptoms feel exactly the same as his last heart attack, advise he proceed to the ER for further evaluation via EMS.  Patient declines EMS at this time and he is aware of risks.  He plans to drive himself to the ER for further evaluation.  Again, I " advised him that I do not recommend he drive himself and I do advise him to take EMS but he declines EMS.  His EKG was faxed to [facility] ER. He asked about going to [facility], but I advised him that I do not recommend he travel that far at this time. I informed him that [facility] could transfer him there if needed.     Addendum: Nursing staff reported that patient called and stated he would be going to [facility] instead of [facility].     Follow up:   No follow-ups on file.  Patient was given instructions and counseling regarding his condition or for health maintenance advice. Please see specific information pulled into the AVS if appropriate.

## 2023-04-11 NOTE — PROGRESS NOTES
Procedure     ECG 12 Lead    Date/Time: 4/11/2023 5:57 PM  Performed by: Scooter Christina MD  Authorized by: Maribell Tavarez APRN   Comparison: compared with previous ECG from 5/9/2022  Similar to previous ECG  Rhythm: sinus rhythm  Rate: normal  Conduction: left anterior fascicular block  ST segment elevation noted on lead: Minimum ST elevation anterior leads.  T Waves: T waves normal  T inversion: III  QRS axis: normal    Clinical impression: abnormal EKG

## 2023-04-12 ENCOUNTER — TELEPHONE (OUTPATIENT)
Dept: FAMILY MEDICINE CLINIC | Age: 68
End: 2023-04-12
Payer: MEDICARE

## 2023-04-12 NOTE — TELEPHONE ENCOUNTER
Pt called and said he would like for A De La Cruz to refill his zolpidem 10mg.  He takes 1/2- 1 tab at hs.  Previously prescribed by Dr Hernandez.

## 2023-04-12 NOTE — TELEPHONE ENCOUNTER
Has he signed a controlled consent form, UDS, and I would need to see mac and discuss this with him / check on these

## 2023-04-13 ENCOUNTER — OFFICE VISIT (OUTPATIENT)
Dept: FAMILY MEDICINE CLINIC | Age: 68
End: 2023-04-13
Payer: MEDICARE

## 2023-04-13 VITALS
HEART RATE: 48 BPM | SYSTOLIC BLOOD PRESSURE: 125 MMHG | DIASTOLIC BLOOD PRESSURE: 78 MMHG | BODY MASS INDEX: 32.99 KG/M2 | WEIGHT: 210.2 LBS | RESPIRATION RATE: 17 BRPM | HEIGHT: 67 IN

## 2023-04-13 DIAGNOSIS — I25.10 CAD S/P PERCUTANEOUS CORONARY ANGIOPLASTY: ICD-10-CM

## 2023-04-13 DIAGNOSIS — Z79.899 HIGH RISK MEDICATION USE: Primary | ICD-10-CM

## 2023-04-13 DIAGNOSIS — Z98.61 CAD S/P PERCUTANEOUS CORONARY ANGIOPLASTY: ICD-10-CM

## 2023-04-13 DIAGNOSIS — G47.09 OTHER INSOMNIA: ICD-10-CM

## 2023-04-13 DIAGNOSIS — E29.1 TESTOSTERONE DEFICIENCY IN MALE: ICD-10-CM

## 2023-04-13 LAB
AMPHET+METHAMPHET UR QL: NEGATIVE
AMPHETAMINES UR QL: NEGATIVE
BARBITURATES UR QL SCN: NEGATIVE
BENZODIAZ UR QL SCN: NEGATIVE
BUPRENORPHINE SERPL-MCNC: NEGATIVE NG/ML
CANNABINOIDS SERPL QL: NEGATIVE
COCAINE UR QL: NEGATIVE
EXPIRATION DATE: NORMAL
Lab: NORMAL
MDMA UR QL SCN: NEGATIVE
METHADONE UR QL SCN: NEGATIVE
OPIATES UR QL: NEGATIVE
OXYCODONE UR QL SCN: NEGATIVE
PCP UR QL SCN: NEGATIVE

## 2023-04-13 RX ORDER — ZOLPIDEM TARTRATE 10 MG/1
10 TABLET ORAL NIGHTLY
COMMUNITY
Start: 2023-04-11 | End: 2023-04-13 | Stop reason: SDUPTHER

## 2023-04-13 RX ORDER — ZOLPIDEM TARTRATE 10 MG/1
10 TABLET ORAL NIGHTLY
Qty: 90 TABLET | Refills: 0 | Status: SHIPPED | OUTPATIENT
Start: 2023-04-13

## 2023-04-13 NOTE — PROGRESS NOTES
Kendrick Vargas presents to [facility].    Chief Complaint:  Insomnia (Would like to discuss getting ambien prescription), Hypertension, and Follow-up         History of Present Illness:  Anxiety/ Depression/insomnia  Current medication/celexa and ambien (he has been on this rx over 2 years and takes nightly, has been getting from Dr Hernandez )   Tolerating medication Yes  rx's work, he needs refills of ambien     He was seen here on 4-11-23 and then went to [facility] ER and was discharged   Had mild chest feeling with accompanying vague shaky feeling and some nausea, not associated with exertion. Has established CAD.  On presentation, HR was 47. He has recent observations of bradycardia and stopped metoprolol 6 days ago. No awareness of palpitation. No syncope.  His feeling resolved shortly after presenting. He feels totally normal six hours later.   Significant Findings   _sinus bradycardia, HR 47 at rest  normal serial troponin   He was / is to follow up with Cristiane Sauer on 4-5-23:   Assessment/Plan     Diagnoses/Orders:  There are no diagnoses linked to this encounter.    69 yo male with CAD.     1. CAD - with Inferior STEMI - s/p PCI of RCA lesion. He has nonobstructive mid LAD disease. Risk factor modification - counseled about continued tobacco cessation. On DAP, ACE, Statin, and BB. ECHO showed preserved LV systolic function with no significant valvular or wall motion abnormalities.    2. HTN - uncontrolled. We will change his lisinopril to valsartan 320 mg once a day. Since he has been bradycardic we will stop the beta-blocker. We will also initiate Norvasc at 5 mg once a day. Follow-up as noted.      3. HLD - on Statin medication, treat to goal.     4. GERD - symptoms - told to FU with PCP and GI, started on PPI as been wanting to use NSAIDs for DJD/OA.    5. Bradycardia - Kendrick has been having episodes of bradycardia. This could be related to his metoprolol. We will  stop his metoprolol and reassess with another Holter monitor.     Hypertension:  Current medication: norvasc, valsartan (he accidentally took some beta blocker that cardiology had stopped for a couple of days, he thought it was his ambien) he now thinks that what triggered his recent symptoms that landed him in the ER at [facility]  Tolerating Medication: Yes  Checking BP at home and it is: usually runs 140's/ 80, does not check pulse   Needs refills: no   Labs:  Lab Results       Component                Value               Date                       GLUCOSE                  111 (H)             2023                 BUN                      14                  2023                 CREATININE               1.05                2023                 EGFRIFNONA               63                  2021                 BCR                      13.3                2023                 K                        4.7                 2023                 CO2                      29.0                2023                 CALCIUM                  9.5                 2023                 ALBUMIN                  4.5                 2023                 LABIL2                   1.1 (L)             2021                 AST                      20                  2023                 ALT                      27                  2023              Past Medical History changes since  :       Hospitalizations CAD                  hearing loss bilateral, wears hearing aids         Surgical History:     heart cath :     Joint Replacement: RIGHT KNEE;;; 21; sixth surgery;     Vasectomy         Family History:     Father:  at age 73; Cause of death was liver cancer     Mother:  at age 75; Cause of death was lung cancer;  Type 2 Diabetes     Brother(s): 1 brother(s) total; 1 ;  GSW at 30     Sister(s): Healthy; 1 sister(s) total  "    Son(s): 2 son(s) total;  Obesity     Daughter(s): 1 daughter(s) total         Social History:     Occupation: [employer]. Retired (Prior occupation: teacher/)     Marital Status:  ( and remarried)     Children: 3 children and 2 step-children     Hobbies/Recreation: he enjoys farming         Review of Systems:  Review of Systems   Constitutional: Negative for fatigue and fever.   Respiratory: Negative for cough and shortness of breath.    Cardiovascular: Negative for chest pain, palpitations and leg swelling.   Neurological: Positive for tremors (intermittently ).          Current Outpatient Medications:   •  acetaminophen (TYLENOL) 500 MG tablet, Take 1 tablet by mouth., Disp: , Rfl:   •  amLODIPine (NORVASC) 5 MG tablet, Take 1 tablet by mouth Daily., Disp: , Rfl:   •  aspirin 81 MG chewable tablet, Chew 1 tablet Daily., Disp: , Rfl:   •  atorvastatin (LIPITOR) 40 MG tablet, Take 1 tablet by mouth Every Night., Disp: , Rfl:   •  citalopram (CeleXA) 20 MG tablet, Take 1 tablet by mouth Daily., Disp: , Rfl:   •  clopidogrel (PLAVIX) 75 MG tablet, Take 1 tablet by mouth Daily., Disp: , Rfl:   •  MELATONIN PO, Take  by mouth., Disp: , Rfl:   •  meloxicam (MOBIC) 15 MG tablet, Take 1 tablet by mouth Daily., Disp: , Rfl:   •  valsartan (DIOVAN) 320 MG tablet, Take 1 tablet by mouth Daily., Disp: , Rfl:   •  zolpidem (AMBIEN) 10 MG tablet, Take 1 tablet by mouth Every Night., Disp: 90 tablet, Rfl: 0    Current Facility-Administered Medications:   •  nitroglycerin (NITROSTAT) SL tablet 0.4 mg, 0.4 mg, Sublingual, Q5 Min PRN, McGirk, Swathi, APRN, 0.4 mg at 10/24/22 1145    Vital Signs:   Vitals:    04/13/23 1149   BP: 125/78   BP Location: Right arm   Patient Position: Sitting   Cuff Size: Large Adult   Pulse: (!) 48   Resp: 17   Weight: 95.3 kg (210 lb 3.2 oz)   Height: 168.9 cm (66.5\")         Physical Exam:  Physical Exam  Vitals reviewed.   Constitutional:       General: He is not in acute " distress.     Appearance: Normal appearance.   Neck:      Vascular: No carotid bruit.   Cardiovascular:      Rate and Rhythm: Normal rate and regular rhythm.      Heart sounds: Normal heart sounds. No murmur heard.  Pulmonary:      Effort: Pulmonary effort is normal. No respiratory distress.      Breath sounds: Normal breath sounds.   Musculoskeletal:      Right lower leg: No edema.      Left lower leg: No edema.   Neurological:      Mental Status: He is alert.   Psychiatric:         Mood and Affect: Mood normal.         Behavior: Behavior normal.         Result Review      The following data was reviewed by: YT Ornelas on 04/13/2023:    Results for orders placed or performed in visit on 04/13/23   POC Urine Drug Screen [facility]    Specimen: Urine   Result Value Ref Range    Amphetamine Screen, Urine Negative Negative    Barbiturates Screen, Urine Negative Negative    Buprenorphine, Screen, Urine Negative Negative    Benzodiazepine Screen, Urine Negative Negative    Cocaine Screen, Urine Negative Negative    MDMA (ECSTASY) Negative Negative    Methamphetamine, Ur Negative Negative    Methadone Screen, Urine Negative Negative    Opiate Screen Negative Negative    Oxycodone Screen, Urine Negative Negative    Phencyclidine (PCP), Urine Negative Negative    THC, Screen, Urine Negative Negative    Lot Number [id number]     Expiration Date 3/13/2024                Assessment and Plan:          Diagnoses and all orders for this visit:    1. High risk medication use (Primary)  Assessment & Plan:  Reviewed, discussed and signed controlled consent form; UDS negative, mac reviewed     Orders:  -     POC Urine Drug Screen [facility]    2. Other insomnia  Assessment & Plan:  Discussed rx, risk vs benefit, continue ambien as directed     Orders:  -     zolpidem (AMBIEN) 10 MG tablet; Take 1 tablet by mouth Every Night.  Dispense: 90 tablet; Refill: 0    3. Testosterone deficiency in male  Assessment  & Plan:  He reports he no longer is on testosterone due to his CAD, did help with his fatigue       4. CAD S/P percutaneous coronary angioplasty  Assessment & Plan:  Reviewed vital signs today, rx list, last cardiology note and ER visit, to contact Dr Sauer and follow up, take rx's as directed           Follow Up   Return for Next scheduled follow up.  Patient was given instructions and counseling regarding his condition or for health maintenance advice. Please see specific information pulled into the AVS if appropriate.

## 2023-04-13 NOTE — ASSESSMENT & PLAN NOTE
Reviewed vital signs today, rx list, last cardiology note and ER visit, to contact Dr Sauer and follow up, take rx's as directed

## 2023-04-20 ENCOUNTER — LAB (OUTPATIENT)
Dept: LAB | Facility: HOSPITAL | Age: 68
End: 2023-04-20
Payer: MEDICARE

## 2023-04-20 ENCOUNTER — OFFICE VISIT (OUTPATIENT)
Dept: FAMILY MEDICINE CLINIC | Age: 68
End: 2023-04-20
Payer: MEDICARE

## 2023-04-20 VITALS
SYSTOLIC BLOOD PRESSURE: 108 MMHG | DIASTOLIC BLOOD PRESSURE: 71 MMHG | HEART RATE: 54 BPM | BODY MASS INDEX: 33.15 KG/M2 | HEIGHT: 67 IN | WEIGHT: 211.2 LBS

## 2023-04-20 DIAGNOSIS — Z11.59 SCREENING FOR VIRAL DISEASE: ICD-10-CM

## 2023-04-20 DIAGNOSIS — Z00.00 ROUTINE GENERAL MEDICAL EXAMINATION AT A HEALTH CARE FACILITY: ICD-10-CM

## 2023-04-20 DIAGNOSIS — Z23 IMMUNIZATION DUE: Primary | ICD-10-CM

## 2023-04-20 DIAGNOSIS — Z12.5 SCREENING FOR PROSTATE CANCER: ICD-10-CM

## 2023-04-20 LAB
HCV AB SER DONR QL: NORMAL
PSA SERPL-MCNC: 0.15 NG/ML (ref 0–4)

## 2023-04-20 PROCEDURE — G0103 PSA SCREENING: HCPCS

## 2023-04-20 PROCEDURE — 86803 HEPATITIS C AB TEST: CPT

## 2023-04-20 PROCEDURE — 36415 COLL VENOUS BLD VENIPUNCTURE: CPT

## 2023-04-20 NOTE — PROGRESS NOTES
The ABCs of the Annual Wellness Visit  Subsequent Medicare Wellness Visit    Subjective    Kendrick Vargas is a 68 y.o. male who presents for a Subsequent Medicare Wellness Visit.    Medicare wellness HPI  Exercises regularly: yes   Eats healthy: tries   Last PSA: none in our record   Wears seatbelts: no   Living will: thinks he does, get us a copy   Optometrist:Jorge   Dentist: Yaya   Tobacco: non smoker but uses nicotine gum   Alcohol intake: none   Drugs: none   Falls:none   Colonoscopy: 2021, cologuard neg, CLN scheduled at [facility] 6-2023  Immunizations: unsure on Tdap, does not think he had shingrex, had one pneum and UTD on flu, had 3 covid vaccines     The following portions of the patient's history were reviewed and   updated as appropriate: allergies, current medications, past family history, past medical history, past social history, past surgical history and problem list.    Compared to one year ago, the patient feels his physical   health is the same.    Compared to one year ago, the patient feels his mental   health is the same.    Recent Hospitalizations:  He was admitted within the past 365 days at [facility].       Current Medical Providers:  Patient Care Team:  Swathi De La Cruz APRN as PCP - General (Family Medicine)  Rene Roman DO as Consulting Physician (Cardiac Electrophysiology)    Outpatient Medications Prior to Visit   Medication Sig Dispense Refill   • acetaminophen (TYLENOL) 500 MG tablet Take 1 tablet by mouth.     • amLODIPine (NORVASC) 5 MG tablet Take 1 tablet by mouth Daily.     • aspirin 81 MG chewable tablet Chew 1 tablet Daily.     • atorvastatin (LIPITOR) 40 MG tablet Take 1 tablet by mouth Every Night.     • citalopram (CeleXA) 20 MG tablet Take 1 tablet by mouth Daily.     • clopidogrel (PLAVIX) 75 MG tablet Take 1 tablet by mouth Daily.     • MELATONIN PO Take  by mouth.     • meloxicam (MOBIC) 15 MG tablet Take 1 tablet by mouth Daily.     • valsartan  "(DIOVAN) 320 MG tablet Take 1 tablet by mouth Daily.     • zolpidem (AMBIEN) 10 MG tablet Take 1 tablet by mouth Every Night. 90 tablet 0     Facility-Administered Medications Prior to Visit   Medication Dose Route Frequency Provider Last Rate Last Admin   • nitroglycerin (NITROSTAT) SL tablet 0.4 mg  0.4 mg Sublingual Q5 Min PRN Luz ElenaSwathi galeano, APRN   0.4 mg at 10/24/22 1145       No opioid medication identified on active medication list. I have reviewed chart for other potential  high risk medication/s and harmful drug interactions in the elderly.          Aspirin is on active medication list. Aspirin use is indicated based on review of current medical condition/s. Pros and cons of this therapy have been discussed today. Benefits of this medication outweigh potential harm.  Patient has been encouraged to continue taking this medication.  .      Patient Active Problem List   Diagnosis   • Essential hypertension   • Testosterone deficiency in male   • Allergic rhinitis due to allergen   • Other fatigue   • Routine general medical examination at a health care facility   • Need for pneumococcal vaccine   • CAD S/P percutaneous coronary angioplasty   • ST elevation myocardial infarction (STEMI)   • Arthralgia   • Other mixed anxiety disorders   • Mixed hyperlipidemia   • High risk medication use   • Other insomnia   • Immunization due   • Screening for viral disease   • Screening for prostate cancer     Advance Care Planning   Advance Care Planning     Advance Directive is not on file.  ACP discussion was held with the patient during this visit. Patient has an advance directive (not in EMR), copy requested.     Objective    Vitals:    04/20/23 0840   BP: 108/71   BP Location: Right arm   Patient Position: Sitting   Pulse: 54   Weight: 95.8 kg (211 lb 3.2 oz)   Height: 168.9 cm (66.5\")     Estimated body mass index is 33.58 kg/m² as calculated from the following:    Height as of this encounter: 168.9 cm (66.5\").    " Weight as of this encounter: 95.8 kg (211 lb 3.2 oz).    BMI is >= 30 and <35. (Class 1 Obesity). The following options were offered after discussion;: exercise counseling/recommendations and nutrition counseling/recommendations      Does the patient have evidence of cognitive impairment? No          HEALTH RISK ASSESSMENT    Smoking Status:  Social History     Tobacco Use   Smoking Status Never   Smokeless Tobacco Never   Tobacco Comments    Reports chewing 2mg nicotine gum/ 15 per day for 15 years     Alcohol Consumption:  Social History     Substance and Sexual Activity   Alcohol Use Not Currently     Fall Risk Screen:    THOMAS Fall Risk Assessment has not been completed.    Depression Screenin/20/2023     8:45 AM   PHQ-2/PHQ-9 Depression Screening   Little Interest or Pleasure in Doing Things 0-->not at all   Feeling Down, Depressed or Hopeless 0-->not at all   PHQ-9: Brief Depression Severity Measure Score 0       Health Habits and Functional and Cognitive Screenin/20/2023     8:00 AM   Functional & Cognitive Status   Do you have difficulty preparing food and eating? No   Do you have difficulty bathing yourself, getting dressed or grooming yourself? No   Do you have difficulty using the toilet? No   Do you have difficulty moving around from place to place? No   Do you have trouble with steps or getting out of a bed or a chair? No   Current Diet Well Balanced Diet   Dental Exam Up to date   Eye Exam Up to date   Exercise (times per week) 5 times per week        Exercise Comment working   Do you need help using the phone?  No   Are you deaf or do you have serious difficulty hearing?  No   Do you need help with transportation? No   Do you need help shopping? No   Do you need help preparing meals?  No   Do you need help with housework?  No   Do you need help with laundry? No   Do you need help taking your medications? No   Do you need help managing money? No   Do you ever drive or ride in a car  without wearing a seat belt? Yes   Have you felt unusual stress, anger or loneliness in the last month? No   Who do you live with? Spouse   If you need help, do you have trouble finding someone available to you? No   Do you have difficulty concentrating, remembering or making decisions? No       Age-appropriate Screening Schedule:  Refer to the list below for future screening recommendations based on patient's age, sex and/or medical conditions. Orders for these recommended tests are listed in the plan section. The patient has been provided with a written plan.    Health Maintenance   Topic Date Due   • TDAP/TD VACCINES (1 - Tdap) Never done   • ZOSTER VACCINE (1 of 2) Never done   • HEPATITIS C SCREENING  Never done   • INFLUENZA VACCINE  08/01/2023   • LIPID PANEL  01/05/2024   • COLORECTAL CANCER SCREENING  03/23/2024   • ANNUAL WELLNESS VISIT  04/20/2024   • COVID-19 Vaccine  Completed   • Pneumococcal Vaccine 65+  Completed                  CMS Preventative Services Quick Reference  Risk Factors Identified During Encounter  Immunizations Discussed/Encouraged: Tdap, Prevnar 20 (Pneumococcal 20-valent conjugate), Shingrix and COVID19  The above risks/problems have been discussed with the patient.  Pertinent information has been shared with the patient in the After Visit Summary.  An After Visit Summary and PPPS were made available to the patient.    Follow Up:   Next Medicare Wellness visit to be scheduled in 1 year.       Additional E&M Note during same encounter follows:  Patient has multiple medical problems which are significant and separately identifiable that require additional work above and beyond the Medicare Wellness Visit.      Chief Complaint  Medicare Wellness-subsequent    Subjective        HPI  Kendrick Vargas is also being seen today for wellness exam     Review of Systems   Constitutional: Negative for fatigue and fever.   HENT: Negative for sore throat.    Eyes: Negative for visual disturbance.  "  Respiratory: Negative for cough and shortness of breath.    Cardiovascular: Negative for chest pain, palpitations and leg swelling.   Gastrointestinal: Negative for abdominal pain.   Genitourinary: Negative for difficulty urinating.   Musculoskeletal: Negative for arthralgias.   Skin: Negative for rash.   Hematological: Negative for adenopathy.   Psychiatric/Behavioral: Negative for sleep disturbance.       Objective   Vital Signs:  /71 (BP Location: Right arm, Patient Position: Sitting)   Pulse 54   Ht 168.9 cm (66.5\")   Wt 95.8 kg (211 lb 3.2 oz)   BMI 33.58 kg/m²     Physical Exam  Vitals reviewed.   Constitutional:       Appearance: Normal appearance. He is well-developed.   HENT:      Head: Normocephalic and atraumatic.      Right Ear: External ear normal.      Left Ear: External ear normal.      Mouth/Throat:      Pharynx: No oropharyngeal exudate.   Eyes:      Conjunctiva/sclera: Conjunctivae normal.      Pupils: Pupils are equal, round, and reactive to light.   Cardiovascular:      Rate and Rhythm: Normal rate and regular rhythm.      Heart sounds: No murmur heard.    No friction rub. No gallop.   Pulmonary:      Effort: Pulmonary effort is normal.      Breath sounds: Normal breath sounds. No wheezing or rhonchi.   Abdominal:      General: Bowel sounds are normal. There is no distension.      Palpations: Abdomen is soft.      Tenderness: There is no abdominal tenderness.   Skin:     General: Skin is warm and dry.   Neurological:      Mental Status: He is alert and oriented to person, place, and time.      Cranial Nerves: No cranial nerve deficit.   Psychiatric:         Mood and Affect: Mood and affect normal.         Behavior: Behavior normal.         Thought Content: Thought content normal.         Judgment: Judgment normal.                         Assessment and Plan   Diagnoses and all orders for this visit:    1. Immunization due (Primary)  Assessment & Plan:  Updating today     Orders:  -    "  Pneumococcal Conjugate Vaccine 20-Valent (PCV20)    2. Routine general medical examination at a health care facility  Assessment & Plan:  Advise regular exercise, healthy eating, always wear seat belts. Living will discussed, bring in a copy, fall prevention discussed.  Immunizations discussed. He declines covid vaccine booster today, will update with pneumonia vaccine, to check on coverage of shignrex and tdap with his pharmacy /insurance   To continue yearly optometry and dental exams.          3. Screening for viral disease  Assessment & Plan:  Screen for hep C    Orders:  -     Hepatitis C antibody; Future    4. Screening for prostate cancer  Assessment & Plan:  Discussed PSA screening, will screen today     Orders:  -     PSA SCREENING; Future           Follow Up   Return for followup pending lab results.  Patient was given instructions and counseling regarding his condition or for health maintenance advice. Please see specific information pulled into the AVS if appropriate.

## 2023-04-20 NOTE — ASSESSMENT & PLAN NOTE
Advise regular exercise, healthy eating, always wear seat belts. Living will discussed, bring in a copy, fall prevention discussed.  Immunizations discussed. He declines covid vaccine booster today, will update with pneumonia vaccine, to check on coverage of shignrex and tdap with his pharmacy /insurance   To continue yearly optometry and dental exams.

## 2023-07-05 PROBLEM — N52.9 ERECTILE DYSFUNCTION: Status: ACTIVE | Noted: 2023-07-05

## 2023-07-05 PROBLEM — R68.82 LOW LIBIDO: Status: ACTIVE | Noted: 2023-07-05

## 2023-09-25 DIAGNOSIS — G47.09 OTHER INSOMNIA: ICD-10-CM

## 2023-09-25 RX ORDER — ZOLPIDEM TARTRATE 10 MG/1
10 TABLET ORAL NIGHTLY
Qty: 90 TABLET | OUTPATIENT
Start: 2023-09-25

## 2023-10-09 ENCOUNTER — TELEPHONE (OUTPATIENT)
Dept: FAMILY MEDICINE CLINIC | Age: 68
End: 2023-10-09
Payer: MEDICARE

## 2023-10-09 DIAGNOSIS — G47.09 OTHER INSOMNIA: ICD-10-CM

## 2023-10-09 DIAGNOSIS — M79.605 LEG PAIN, LEFT: Primary | ICD-10-CM

## 2023-10-09 RX ORDER — ZOLPIDEM TARTRATE 10 MG/1
10 TABLET ORAL NIGHTLY
Qty: 90 TABLET | Refills: 0 | Status: SHIPPED | OUTPATIENT
Start: 2023-10-09

## 2023-10-09 NOTE — TELEPHONE ENCOUNTER
Place an order for PT here, left hamstring pain for eval and treatment, I will sign, if not improving follow up

## 2023-10-09 NOTE — TELEPHONE ENCOUNTER
Pt said he thinks he pulled his hamstring 2 weeks ago and would like a referral for PT.   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  He is having trouble walking, having pain down left leg.  He is okay with either [facility] or [facility] up stairs at our office.

## 2023-10-09 NOTE — TELEPHONE ENCOUNTER
Rx Refill Note  Requested Prescriptions     Pending Prescriptions Disp Refills    zolpidem (AMBIEN) 10 MG tablet 90 tablet 0     Sig: Take 1 tablet by mouth Every Night.      Last office visit with prescribing clinician: 7/5/2023   Last telemedicine visit with prescribing clinician: Visit date not found   Next office visit with prescribing clinician: Visit date not found  Last refill sent: 07/13/23, #90  POC Urine Drug Screen [facility] (04/13/2023 12:33)     Florencia Donovan LPN  10/09/23, 14:38 EDT

## 2024-02-01 RX ORDER — VALSARTAN 320 MG/1
320 TABLET ORAL DAILY
Qty: 90 TABLET | Refills: 0 | Status: SHIPPED | OUTPATIENT
Start: 2024-02-01

## 2024-02-01 NOTE — TELEPHONE ENCOUNTER
"    Caller: Kendrick Vargas \"Esteban\"    Relationship: Self    Best call back number: [phone]    Requested Prescriptions:   Requested Prescriptions     Pending Prescriptions Disp Refills    valsartan (DIOVAN) 320 MG tablet       Sig: Take 1 tablet by mouth Daily.        [facility] [address] [phone] [phone] FX     Last office visit with prescribing clinician: 7/5/2023   Last telemedicine visit with prescribing clinician: Visit date not found   Next office visit with prescribing clinician: Visit date not found     Additional details provided by patient: ONLY HAS ENOUGH FOR ONE DAY     Does the patient have less than a 3 day supply:  [x] Yes  [] No    Would you like a call back once the refill request has been completed: [] Yes [] No    If the office needs to give you a call back, can they leave a voicemail: [] Yes [] No    Mesha Caceres   02/01/24 08:40 EST         "

## 2024-02-07 ENCOUNTER — LAB (OUTPATIENT)
Dept: LAB | Facility: HOSPITAL | Age: 69
End: 2024-02-07
Payer: MEDICARE

## 2024-02-07 ENCOUNTER — OFFICE VISIT (OUTPATIENT)
Dept: FAMILY MEDICINE CLINIC | Age: 69
End: 2024-02-07
Payer: MEDICARE

## 2024-02-07 VITALS
BODY MASS INDEX: 34.53 KG/M2 | SYSTOLIC BLOOD PRESSURE: 118 MMHG | WEIGHT: 220 LBS | HEART RATE: 59 BPM | HEIGHT: 67 IN | DIASTOLIC BLOOD PRESSURE: 69 MMHG | TEMPERATURE: 97.7 F

## 2024-02-07 DIAGNOSIS — N52.9 ERECTILE DYSFUNCTION, UNSPECIFIED ERECTILE DYSFUNCTION TYPE: ICD-10-CM

## 2024-02-07 DIAGNOSIS — F41.3 OTHER MIXED ANXIETY DISORDERS: ICD-10-CM

## 2024-02-07 DIAGNOSIS — I10 ESSENTIAL HYPERTENSION: Primary | ICD-10-CM

## 2024-02-07 DIAGNOSIS — G47.09 OTHER INSOMNIA: ICD-10-CM

## 2024-02-07 DIAGNOSIS — E78.2 MIXED HYPERLIPIDEMIA: ICD-10-CM

## 2024-02-07 PROBLEM — Z79.899 HIGH RISK MEDICATION USE: Status: RESOLVED | Noted: 2023-04-13 | Resolved: 2024-02-07

## 2024-02-07 LAB
ALBUMIN SERPL-MCNC: 4.5 G/DL (ref 3.5–5.2)
ALBUMIN/GLOB SERPL: 1.7 G/DL
ALP SERPL-CCNC: 138 U/L (ref 39–117)
ALT SERPL W P-5'-P-CCNC: 27 U/L (ref 1–41)
ANION GAP SERPL CALCULATED.3IONS-SCNC: 11.5 MMOL/L (ref 5–15)
AST SERPL-CCNC: 26 U/L (ref 1–40)
BILIRUB SERPL-MCNC: 0.5 MG/DL (ref 0–1.2)
BUN SERPL-MCNC: 14 MG/DL (ref 8–23)
BUN/CREAT SERPL: 11.4 (ref 7–25)
CALCIUM SPEC-SCNC: 9.4 MG/DL (ref 8.6–10.5)
CHLORIDE SERPL-SCNC: 99 MMOL/L (ref 98–107)
CHOLEST SERPL-MCNC: 97 MG/DL (ref 0–200)
CO2 SERPL-SCNC: 25.5 MMOL/L (ref 22–29)
CREAT SERPL-MCNC: 1.23 MG/DL (ref 0.76–1.27)
EGFRCR SERPLBLD CKD-EPI 2021: 64.3 ML/MIN/1.73
GLOBULIN UR ELPH-MCNC: 2.6 GM/DL
GLUCOSE SERPL-MCNC: 156 MG/DL (ref 65–99)
HDLC SERPL-MCNC: 39 MG/DL (ref 40–60)
LDLC SERPL CALC-MCNC: 40 MG/DL (ref 0–100)
LDLC/HDLC SERPL: 1.01 {RATIO}
POTASSIUM SERPL-SCNC: 4 MMOL/L (ref 3.5–5.2)
PROT SERPL-MCNC: 7.1 G/DL (ref 6–8.5)
SODIUM SERPL-SCNC: 136 MMOL/L (ref 136–145)
TRIGL SERPL-MCNC: 94 MG/DL (ref 0–150)
VLDLC SERPL-MCNC: 18 MG/DL (ref 5–40)

## 2024-02-07 PROCEDURE — 80061 LIPID PANEL: CPT | Performed by: NURSE PRACTITIONER

## 2024-02-07 PROCEDURE — 3078F DIAST BP <80 MM HG: CPT | Performed by: NURSE PRACTITIONER

## 2024-02-07 PROCEDURE — 80053 COMPREHEN METABOLIC PANEL: CPT | Performed by: NURSE PRACTITIONER

## 2024-02-07 PROCEDURE — 3074F SYST BP LT 130 MM HG: CPT | Performed by: NURSE PRACTITIONER

## 2024-02-07 PROCEDURE — 99214 OFFICE O/P EST MOD 30 MIN: CPT | Performed by: NURSE PRACTITIONER

## 2024-02-07 PROCEDURE — 36415 COLL VENOUS BLD VENIPUNCTURE: CPT | Performed by: NURSE PRACTITIONER

## 2024-02-07 RX ORDER — TESTOSTERONE CYPIONATE 200 MG/ML
INJECTION, SOLUTION INTRAMUSCULAR
COMMUNITY
Start: 2024-01-25

## 2024-02-07 RX ORDER — SILDENAFIL 25 MG/1
25 TABLET, FILM COATED ORAL
COMMUNITY
Start: 2023-09-14

## 2024-02-07 NOTE — ASSESSMENT & PLAN NOTE
Hypertension is stable. to monitor BP at home. Continue current meds. Continue to modify diet and lifestyle.

## 2024-02-07 NOTE — PROGRESS NOTES
Chief Complaint  Hypertension (6 month follow up HTN, HLD, and insomnia)    Subjective          Kendrick Vargas presents to [facility]    History of Present Illness  Hyperlipidemia  Current medication: lipitor   Tolerating medication: Yes  Needs Refill: no    Lab Results       Component                Value               Date                       CHOL                     85                  07/12/2023                 TRIG                     46                  07/12/2023                 HDL                      37 (L)              07/12/2023                 LDL                      36                  07/12/2023                Hypertension:  Current medication: diovan and norvasc  Tolerating Medication: Yes  Checking BP at home and it is: not checking   Needs refills: no, gets from cardiology and is on ASA   Labs:  Lab Results       Component                Value               Date                       GLUCOSE                  105 (H)             07/12/2023                 BUN                      25 (H)              07/12/2023                 CREATININE               1.09                07/12/2023                 EGFRIFNONA               63                  12/09/2021                 EGFRIFAFRI               >60                 11/19/2020                 BCR                      22.9                07/12/2023                 K                        4.4                 07/12/2023                 CO2                      24.6                07/12/2023                 CALCIUM                  9.3                 07/12/2023                 ALBUMIN                  4.2                 07/12/2023                 LABIL2                   1.1 (L)             01/11/2021                 AST                      31                  07/12/2023                 ALT                      27                  07/12/2023              Takes mobic for joint pain.  Will let me know when he needs  refills     [facility], getting treatment for ED, just started back on testosterone inj recently, gets every 2 weeks    Anxiety/ Depression/insomnia   Current medication/was on ambien, stopped /now using THC  Was on Celexa, stopped recently to see if helped his ED  UDS  negative     Past Medical History changes since  :       Hospitalizations CAD         Heat related issues/ syncope in the past /saw David after the episodes             hearing loss bilateral, wears hearing aids         Surgical History:   23 CLN, at [facility]     heart cath :     Joint Replacement: RIGHT KNEE;;; 20; sixth surgery;     Vasectomy         Family History:     Father:  at age 73; Cause of death was liver cancer     Mother:  at age 75; Cause of death was lung cancer;  Type 2 Diabetes     Brother(s): 1 brother(s) total; 1 ;  GSW at 30     Sister(s): Healthy; 1 sister(s) total     Son(s): 2 son(s) total;  Obesity     Daughter(s): 1 daughter(s) total         Social History:     Occupation: [employer]. Retired (Prior occupation: teacher/)     Marital Status:  ( and remarried)     Children: 3 children and 2 step-children     Hobbies/Recreation: he enjoys farming/coaching                       Past Medical History:   Diagnosis Date    Allergic rhinitis, unspecified     Chronic low back pain     Essential (primary) hypertension     Hearing loss, bilateral     wears hearing aids    BRIELLE on CPAP     Other insomnia     Other long term (current) drug therapy     Testicular hypofunction     Transient visual loss, bilateral     Unilateral primary osteoarthritis, right knee        No Known Allergies     Past Surgical History:   Procedure Laterality Date    COLONOSCOPY W/ BIOPSIES N/A 2023    JOINT REPLACEMENT Right 2020    sixth surgery    KNEE SURGERY Right     Right knee OA. removal of RTKR, deep implant removal    VASECTOMY          Social History      Tobacco Use    Smoking status: Never     Passive exposure: Past    Smokeless tobacco: Never    Tobacco comments:     Reports chewing 2mg nicotine gum/ 15 per day for 15 years   Substance Use Topics    Alcohol use: Not Currently       Family History   Problem Relation Age of Onset    Lung cancer Mother     Diabetes type II Mother     Liver cancer Father     Obesity Son         Health Maintenance Due   Topic Date Due    TDAP/TD VACCINES (1 - Tdap) Never done    ZOSTER VACCINE (1 of 2) Never done    RSV Vaccine - Adults (1 - 1-dose 60+ series) Never done    INFLUENZA VACCINE  08/01/2023    COVID-19 Vaccine (7 - 2023-24 season) 09/01/2023        Current Outpatient Medications on File Prior to Visit   Medication Sig    amLODIPine (NORVASC) 5 MG tablet Take 1 tablet by mouth Daily.    atorvastatin (LIPITOR) 40 MG tablet Take 0.5 tablets by mouth Every Night.    meloxicam (MOBIC) 15 MG tablet Take 1 tablet by mouth Daily.    sildenafil (VIAGRA) 25 MG tablet Take 1 tablet by mouth.    Testosterone Cypionate (DEPOTESTOTERONE CYPIONATE) 200 MG/ML injection INJECT 0.5 ML INTO THE MUSCLE EVERY 2 WEEKS    valsartan (DIOVAN) 320 MG tablet Take 1 tablet by mouth Daily.    [DISCONTINUED] clopidogrel (PLAVIX) 75 MG tablet Take 1 tablet by mouth Daily. (Patient not taking: Reported on 2/7/2024)    [DISCONTINUED] MELATONIN PO Take  by mouth. (Patient not taking: Reported on 2/7/2024)    [DISCONTINUED] zolpidem (AMBIEN) 10 MG tablet Take 1 tablet by mouth Every Night. (Patient not taking: Reported on 2/7/2024)     Current Facility-Administered Medications on File Prior to Visit   Medication    nitroglycerin (NITROSTAT) SL tablet 0.4 mg       Immunization History   Administered Date(s) Administered    COVID-19 (MODERNA) 1st,2nd,3rd Dose Monovalent 03/17/2021, 04/14/2021, 11/22/2021    COVID-19 (UNSPECIFIED) 03/17/2021, 04/14/2021, 11/22/2021    Fluzone (or Fluarix & Flulaval for VFC) >6mos 11/05/2022    Fluzone High Dose =>65 Years  "([employer]axcare ONLY) 11/05/2022    Fluzone High-Dose 65+yrs 12/09/2021    Pneumococcal Conjugate 20-Valent (PCV20) 04/20/2023    Pneumococcal Polysaccharide (PPSV23) 04/14/2022       Review of Systems   Constitutional:  Negative for fatigue and fever.   Respiratory:  Negative for cough and shortness of breath.    Cardiovascular:  Negative for chest pain, palpitations and leg swelling.   Neurological:  Positive for dizziness (since stopping celexa).        Objective     Vitals:    02/07/24 0900   BP: 118/69   BP Location: Left arm   Patient Position: Sitting   Cuff Size: Large Adult   Pulse: 59   Temp: 97.7 °F (36.5 °C)   TempSrc: Oral   Weight: 99.8 kg (220 lb)   Height: 168.9 cm (66.5\")            Physical Exam  Vitals reviewed.   Constitutional:       General: He is not in acute distress.     Appearance: Normal appearance.   Neck:      Vascular: No carotid bruit.   Cardiovascular:      Rate and Rhythm: Normal rate and regular rhythm.      Heart sounds: Normal heart sounds. No murmur heard.  Pulmonary:      Effort: Pulmonary effort is normal. No respiratory distress.      Breath sounds: Normal breath sounds.   Musculoskeletal:      Right lower leg: No edema.      Left lower leg: No edema.   Neurological:      Mental Status: He is alert.   Psychiatric:         Mood and Affect: Mood normal.         Behavior: Behavior normal.         Result Review :     The following data was reviewed by: TY Ornelas on 02/07/2024:                       Assessment and Plan      Diagnoses and all orders for this visit:    1. Essential hypertension (Primary)  Assessment & Plan:  Hypertension is stable. to monitor BP at home. Continue current meds. Continue to modify diet and lifestyle.    Orders:  -     Comprehensive Metabolic Panel  -     Lipid Panel    2. Mixed hyperlipidemia  Assessment & Plan:  Ate Part of a pop tart and coffee with cream this am, seeing urology tomorrow for testing   Continue current medication and efforts " with diet and exercise.       Orders:  -     Comprehensive Metabolic Panel  -     Lipid Panel    3. Other insomnia  Assessment & Plan:  Discussed rx, diet, regular exercise, may want to try the THC gummies vs smoking       4. Erectile dysfunction, unspecified erectile dysfunction type  Assessment & Plan:  Keep follow up tomorrow with urology       5. Other mixed anxiety disorders  Assessment & Plan:  Will see how he does off rx, consider alternative treatment if needed                   Follow Up     Return if symptoms worsen or fail to improve, for followup pending lab results.    Patient was given instructions and counseling regarding his condition or for health maintenance advice. Please see specific information pulled into the AVS if appropriate.

## 2024-02-07 NOTE — ASSESSMENT & PLAN NOTE
Ate Part of a pop tart and coffee with cream this am, seeing urology tomorrow for testing   Continue current medication and efforts with diet and exercise.

## 2024-03-04 RX ORDER — MELOXICAM 15 MG/1
15 TABLET ORAL DAILY PRN
Qty: 90 TABLET | Refills: 1 | Status: SHIPPED | OUTPATIENT
Start: 2024-03-04

## 2024-03-04 NOTE — TELEPHONE ENCOUNTER
Rx Refill Note  Requested Prescriptions     Pending Prescriptions Disp Refills    meloxicam (MOBIC) 15 MG tablet [Pharmacy Med Name: meloxicam 15 mg tablet] 90 tablet 6     Sig: TAKE 1 TABLET BY MOUTH ONCE DAILY AS NEEDED      Last office visit with prescribing clinician: 2/7/2024   Last telemedicine visit with prescribing clinician: Visit date not found   Next office visit with prescribing clinician: 8/7/2024  Last refill sent by Dr Hernandez 12/01/23, #90      Florencia Donovan LPN  03/04/24, 12:21 EST

## 2024-05-15 RX ORDER — VALSARTAN 320 MG/1
320 TABLET ORAL DAILY
Qty: 90 TABLET | Refills: 0 | Status: SHIPPED | OUTPATIENT
Start: 2024-05-15

## 2024-05-15 NOTE — TELEPHONE ENCOUNTER
"Caller: Kendrick Vargas \"Esteban\"    Relationship: Self    Best call back number: [phone]     Requested Prescriptions:   Requested Prescriptions     Pending Prescriptions Disp Refills    valsartan (DIOVAN) 320 MG tablet 90 tablet 0     Sig: Take 1 tablet by mouth Daily.        Pharmacy where request should be sent: [facility] [address] [place]  - [phone]  - [phone] FX     Last office visit with prescribing clinician: 2/7/2024   Last telemedicine visit with prescribing clinician: Visit date not found   Next office visit with prescribing clinician: 8/7/2024       Does the patient have less than a 3 day supply:  [x] Yes  [] No      Mesha Marshall Rep   05/15/24 15:04 EDT     "

## 2024-07-26 ENCOUNTER — OFFICE VISIT (OUTPATIENT)
Dept: FAMILY MEDICINE CLINIC | Age: 69
End: 2024-07-26
Payer: MEDICARE

## 2024-07-26 ENCOUNTER — LAB (OUTPATIENT)
Dept: LAB | Facility: HOSPITAL | Age: 69
End: 2024-07-26
Payer: MEDICARE

## 2024-07-26 VITALS
WEIGHT: 209 LBS | HEART RATE: 54 BPM | DIASTOLIC BLOOD PRESSURE: 76 MMHG | TEMPERATURE: 97.8 F | BODY MASS INDEX: 32.8 KG/M2 | HEIGHT: 67 IN | OXYGEN SATURATION: 98 % | SYSTOLIC BLOOD PRESSURE: 122 MMHG

## 2024-07-26 DIAGNOSIS — R53.83 FATIGUE, UNSPECIFIED TYPE: ICD-10-CM

## 2024-07-26 DIAGNOSIS — J06.9 UPPER RESPIRATORY TRACT INFECTION, UNSPECIFIED TYPE: Primary | ICD-10-CM

## 2024-07-26 DIAGNOSIS — H69.91 DYSFUNCTION OF RIGHT EUSTACHIAN TUBE: ICD-10-CM

## 2024-07-26 LAB
25(OH)D3 SERPL-MCNC: 37.2 NG/ML (ref 30–100)
ALBUMIN SERPL-MCNC: 4.2 G/DL (ref 3.5–5.2)
ALBUMIN/GLOB SERPL: 1.5 G/DL
ALP SERPL-CCNC: 146 U/L (ref 39–117)
ALT SERPL W P-5'-P-CCNC: 19 U/L (ref 1–41)
ANION GAP SERPL CALCULATED.3IONS-SCNC: 7.6 MMOL/L (ref 5–15)
AST SERPL-CCNC: 20 U/L (ref 1–40)
BASOPHILS # BLD AUTO: 0.01 10*3/MM3 (ref 0–0.2)
BASOPHILS NFR BLD AUTO: 0.2 % (ref 0–1.5)
BILIRUB SERPL-MCNC: 0.5 MG/DL (ref 0–1.2)
BUN SERPL-MCNC: 17 MG/DL (ref 8–23)
BUN/CREAT SERPL: 15.7 (ref 7–25)
CALCIUM SPEC-SCNC: 9.3 MG/DL (ref 8.6–10.5)
CHLORIDE SERPL-SCNC: 103 MMOL/L (ref 98–107)
CO2 SERPL-SCNC: 26.4 MMOL/L (ref 22–29)
CREAT SERPL-MCNC: 1.08 MG/DL (ref 0.76–1.27)
DEPRECATED RDW RBC AUTO: 46.7 FL (ref 37–54)
EGFRCR SERPLBLD CKD-EPI 2021: 74.7 ML/MIN/1.73
EOSINOPHIL # BLD AUTO: 0.25 10*3/MM3 (ref 0–0.4)
EOSINOPHIL NFR BLD AUTO: 4.2 % (ref 0.3–6.2)
ERYTHROCYTE [DISTWIDTH] IN BLOOD BY AUTOMATED COUNT: 13.4 % (ref 12.3–15.4)
FOLATE SERPL-MCNC: 11.4 NG/ML (ref 4.78–24.2)
GLOBULIN UR ELPH-MCNC: 2.8 GM/DL
GLUCOSE SERPL-MCNC: 145 MG/DL (ref 65–99)
HCT VFR BLD AUTO: 42.4 % (ref 37.5–51)
HGB BLD-MCNC: 14.1 G/DL (ref 13–17.7)
IMM GRANULOCYTES # BLD AUTO: 0.02 10*3/MM3 (ref 0–0.05)
IMM GRANULOCYTES NFR BLD AUTO: 0.3 % (ref 0–0.5)
LYMPHOCYTES # BLD AUTO: 1.5 10*3/MM3 (ref 0.7–3.1)
LYMPHOCYTES NFR BLD AUTO: 25.3 % (ref 19.6–45.3)
MCH RBC QN AUTO: 31.3 PG (ref 26.6–33)
MCHC RBC AUTO-ENTMCNC: 33.3 G/DL (ref 31.5–35.7)
MCV RBC AUTO: 94.2 FL (ref 79–97)
MONOCYTES # BLD AUTO: 0.45 10*3/MM3 (ref 0.1–0.9)
MONOCYTES NFR BLD AUTO: 7.6 % (ref 5–12)
NEUTROPHILS NFR BLD AUTO: 3.71 10*3/MM3 (ref 1.7–7)
NEUTROPHILS NFR BLD AUTO: 62.4 % (ref 42.7–76)
PLATELET # BLD AUTO: 202 10*3/MM3 (ref 140–450)
PMV BLD AUTO: 10.5 FL (ref 6–12)
POTASSIUM SERPL-SCNC: 4.6 MMOL/L (ref 3.5–5.2)
PROT SERPL-MCNC: 7 G/DL (ref 6–8.5)
RBC # BLD AUTO: 4.5 10*6/MM3 (ref 4.14–5.8)
SODIUM SERPL-SCNC: 137 MMOL/L (ref 136–145)
T4 FREE SERPL-MCNC: 0.99 NG/DL (ref 0.92–1.68)
TESTOST SERPL-MCNC: 292 NG/DL (ref 193–740)
TSH SERPL DL<=0.05 MIU/L-ACNC: 1.9 UIU/ML (ref 0.27–4.2)
VIT B12 BLD-MCNC: 280 PG/ML (ref 211–946)
WBC NRBC COR # BLD AUTO: 5.94 10*3/MM3 (ref 3.4–10.8)

## 2024-07-26 PROCEDURE — 84439 ASSAY OF FREE THYROXINE: CPT | Performed by: NURSE PRACTITIONER

## 2024-07-26 PROCEDURE — 1126F AMNT PAIN NOTED NONE PRSNT: CPT | Performed by: NURSE PRACTITIONER

## 2024-07-26 PROCEDURE — 36415 COLL VENOUS BLD VENIPUNCTURE: CPT | Performed by: NURSE PRACTITIONER

## 2024-07-26 PROCEDURE — 1159F MED LIST DOCD IN RCRD: CPT | Performed by: NURSE PRACTITIONER

## 2024-07-26 PROCEDURE — 1160F RVW MEDS BY RX/DR IN RCRD: CPT | Performed by: NURSE PRACTITIONER

## 2024-07-26 PROCEDURE — 82746 ASSAY OF FOLIC ACID SERUM: CPT | Performed by: NURSE PRACTITIONER

## 2024-07-26 PROCEDURE — 84403 ASSAY OF TOTAL TESTOSTERONE: CPT | Performed by: NURSE PRACTITIONER

## 2024-07-26 PROCEDURE — 82306 VITAMIN D 25 HYDROXY: CPT

## 2024-07-26 PROCEDURE — 82607 VITAMIN B-12: CPT | Performed by: NURSE PRACTITIONER

## 2024-07-26 PROCEDURE — 3074F SYST BP LT 130 MM HG: CPT | Performed by: NURSE PRACTITIONER

## 2024-07-26 PROCEDURE — 84443 ASSAY THYROID STIM HORMONE: CPT | Performed by: NURSE PRACTITIONER

## 2024-07-26 PROCEDURE — 99214 OFFICE O/P EST MOD 30 MIN: CPT | Performed by: NURSE PRACTITIONER

## 2024-07-26 PROCEDURE — 80053 COMPREHEN METABOLIC PANEL: CPT | Performed by: NURSE PRACTITIONER

## 2024-07-26 PROCEDURE — 85025 COMPLETE CBC W/AUTO DIFF WBC: CPT | Performed by: NURSE PRACTITIONER

## 2024-07-26 PROCEDURE — 3078F DIAST BP <80 MM HG: CPT | Performed by: NURSE PRACTITIONER

## 2024-07-26 RX ORDER — AZELASTINE 1 MG/ML
2 SPRAY, METERED NASAL 2 TIMES DAILY
Qty: 30 ML | Refills: 2 | Status: SHIPPED | OUTPATIENT
Start: 2024-07-26

## 2024-07-26 RX ORDER — AMOXICILLIN AND CLAVULANATE POTASSIUM 875; 125 MG/1; MG/1
1 TABLET, FILM COATED ORAL 2 TIMES DAILY
Qty: 20 TABLET | Refills: 0 | Status: SHIPPED | OUTPATIENT
Start: 2024-07-26 | End: 2024-08-05

## 2024-07-26 RX ORDER — TADALAFIL 5 MG/1
1 TABLET ORAL DAILY
COMMUNITY
Start: 2024-06-28

## 2024-07-26 NOTE — PROGRESS NOTES
"Chief Complaint  URI (X 7-10 days per pt. Head congestion, blurred vision, dizziness. ) and Depression (Discuss Celexa that he stopped taking 6 months ago, would like to possibly get back on it. Pt states he does not have any motivation and is always \"mad.\"/)    Subjective      Kendrick Vargas is a 69 year old male that presents to [facility] with c/o sinus congestion, fatigue and intermittent dizziness for more than 10 days. He admits to occasional chills but denies fever. No body aches, cough or sore throat. No known sick contacts or exposures. He had been taking some zyrtec but states it doesn't help.  States he is also experiencing lack of motivation in the last 7-10 days. States he was previously on celexa but weaned himself off about 6 months ago because he doesn't like taking a lot of medication. Feels like maybe he needs to be back on it. He denies any changes in home or work life. States he is always negative and no one wants to be around him. States he is not sleeping well. Wakes multiple times throughout the night. Does use a cpap but hasn't had it checked in a while.     Not taking testosterone injections due to insurance coverage and urology recommendation to discontinue.       History of Present Illness    Current Outpatient Medications   Medication Instructions    amLODIPine (NORVASC) 5 mg, Oral, Daily    amoxicillin-clavulanate (AUGMENTIN) 875-125 MG per tablet 1 tablet, Oral, 2 Times Daily    atorvastatin (LIPITOR) 20 mg, Oral, Nightly    azelastine (ASTELIN) 0.1 % nasal spray 2 sprays, Nasal, 2 Times Daily, Use in each nostril as directed    meloxicam (MOBIC) 15 mg, Oral, Daily PRN    tadalafil (CIALIS) 5 MG tablet 1 tablet, Oral, Daily    Testosterone Cypionate (DEPOTESTOTERONE CYPIONATE) 200 MG/ML injection INJECT 0.5 ML INTO THE MUSCLE EVERY 2 WEEKS    valsartan (DIOVAN) 320 mg, Oral, Daily       The following portions of the patient's history were reviewed and " "updated as appropriate: allergies, current medications, past family history, past medical history, past social history, past surgical history, and problem list.    Objective   Vital Signs:   /76 (BP Location: Right arm, Patient Position: Sitting)   Pulse 54   Temp 97.8 °F (36.6 °C) (Oral)   Ht 168.9 cm (66.5\")   Wt 94.8 kg (209 lb)   SpO2 98% Comment: room air  BMI 33.23 kg/m²     Wt Readings from Last 3 Encounters:   07/26/24 94.8 kg (209 lb)   02/07/24 99.8 kg (220 lb)   07/05/23 96.9 kg (213 lb 9.6 oz)     BP Readings from Last 3 Encounters:   07/26/24 122/76   02/07/24 118/69   07/05/23 120/72     Physical Exam  Vitals and nursing note reviewed.   Constitutional:       Appearance: Normal appearance. He is not ill-appearing.   HENT:      Head: Normocephalic and atraumatic.      Comments: Bilateral hearing aids     Right Ear: Ear canal and external ear normal. A middle ear effusion is present.      Left Ear: Ear canal and external ear normal.      Ears:      Comments: Unable to visualize left TM, obstructed  but not impacted by thin film of cerumen.      Nose:      Right Sinus: No maxillary sinus tenderness or frontal sinus tenderness.      Left Sinus: No maxillary sinus tenderness or frontal sinus tenderness.      Mouth/Throat:      Lips: Pink.   Pulmonary:      Effort: Pulmonary effort is normal.      Breath sounds: No wheezing.      Comments: Few scattered coarse breath sounds  Skin:     General: Skin is warm and dry.   Neurological:      Mental Status: He is alert and oriented to person, place, and time.   Psychiatric:         Mood and Affect: Mood normal.         Behavior: Behavior normal.          Result Review :  The following data was reviewed by: TY Ruffin on 07/26/2024:      Common labs          2/7/2024    09:33   Common Labs   Glucose 156    BUN 14    Creatinine 1.23    Sodium 136    Potassium 4.0    Chloride 99    Calcium 9.4    Albumin 4.5    Total Bilirubin 0.5  "   Alkaline Phosphatase 138    AST (SGOT) 26    ALT (SGPT) 27    Total Cholesterol 97    Triglycerides 94    HDL Cholesterol 39    LDL Cholesterol  40        Lab Results (last 72 hours)       ** No results found for the last 72 hours. **             No Images in the past 120 days found..    Lab Results   Component Value Date    INR 1.02 04/11/2023       Procedures        Assessment and Plan   Diagnoses and all orders for this visit:    1. Upper respiratory tract infection, unspecified type (Primary)  -     azelastine (ASTELIN) 0.1 % nasal spray; 2 sprays into the nostril(s) as directed by provider 2 (Two) Times a Day. Use in each nostril as directed  Dispense: 30 mL; Refill: 2  -     amoxicillin-clavulanate (AUGMENTIN) 875-125 MG per tablet; Take 1 tablet by mouth 2 (Two) Times a Day for 10 days.  Dispense: 20 tablet; Refill: 0    2. Dysfunction of right eustachian tube    3. Fatigue, unspecified type  -     CBC & Differential  -     Comprehensive Metabolic Panel  -     TSH  -     T4, Free  -     Vitamin B12  -     Folate  -     Testosterone  -     Vitamin D 25 hydroxy; Future    4. Body mass index (BMI) 33.0-33.9, adult  -     Vitamin D 25 hydroxy; Future      Discussed with patient plan to treat URI symptoms first and check some labs since symptoms all started around the same time. After abx therapy if still experiencing fatigue, lack of motivation, etc., we can consider depression and discuss treatment. Pt states that he does not like to take a lot of medications so I would like to start with acute issues first and go from there. Pt in agreement with plan and states he will follow up if needed. Next appt with pcp on 8/7.            Medications Discontinued During This Encounter   Medication Reason    sildenafil (VIAGRA) 25 MG tablet *Therapy completed          Follow Up   No follow-ups on file.  Patient was given instructions and counseling regarding his condition or for health maintenance advice. Please see  specific information pulled into the AVS if appropriate.       Gladys Nelson, TY  07/26/24  09:01 EDT

## 2024-07-29 ENCOUNTER — TELEPHONE (OUTPATIENT)
Dept: FAMILY MEDICINE CLINIC | Age: 69
End: 2024-07-29
Payer: MEDICARE

## 2024-07-29 NOTE — TELEPHONE ENCOUNTER
Pt called and said he is wanting to go back on the citalopram for his depression.  He recently saw Gladys Nelson aprn 07/26/24 for a URI and mentioned it to her as well.  He told her he does not have any motivation and is always mad. She wanted to get the URI infection taken care of and get some lab work done.    Appt with pcp KHADRA De La Cruz is 08/07/24.  Pt will discuss it futher with her at his appt.

## 2024-08-07 ENCOUNTER — OFFICE VISIT (OUTPATIENT)
Dept: FAMILY MEDICINE CLINIC | Age: 69
End: 2024-08-07
Payer: MEDICARE

## 2024-08-07 ENCOUNTER — LAB (OUTPATIENT)
Dept: LAB | Facility: HOSPITAL | Age: 69
End: 2024-08-07
Payer: MEDICARE

## 2024-08-07 VITALS
WEIGHT: 206 LBS | TEMPERATURE: 97.4 F | HEIGHT: 67 IN | BODY MASS INDEX: 32.33 KG/M2 | DIASTOLIC BLOOD PRESSURE: 68 MMHG | HEART RATE: 50 BPM | SYSTOLIC BLOOD PRESSURE: 121 MMHG

## 2024-08-07 DIAGNOSIS — R73.9 ELEVATED BLOOD SUGAR: ICD-10-CM

## 2024-08-07 DIAGNOSIS — I10 ESSENTIAL HYPERTENSION: ICD-10-CM

## 2024-08-07 DIAGNOSIS — M25.50 ARTHRALGIA, UNSPECIFIED JOINT: Primary | ICD-10-CM

## 2024-08-07 DIAGNOSIS — F41.3 OTHER MIXED ANXIETY DISORDERS: ICD-10-CM

## 2024-08-07 DIAGNOSIS — E78.2 MIXED HYPERLIPIDEMIA: ICD-10-CM

## 2024-08-07 LAB
CHOLEST SERPL-MCNC: 72 MG/DL (ref 0–200)
HBA1C MFR BLD: 6.1 % (ref 4.8–5.6)
HDLC SERPL-MCNC: 34 MG/DL (ref 40–60)
LDLC SERPL CALC-MCNC: 27 MG/DL (ref 0–100)
LDLC/HDLC SERPL: 0.9 {RATIO}
TRIGL SERPL-MCNC: 37 MG/DL (ref 0–150)
VLDLC SERPL-MCNC: 11 MG/DL (ref 5–40)

## 2024-08-07 PROCEDURE — 3074F SYST BP LT 130 MM HG: CPT | Performed by: NURSE PRACTITIONER

## 2024-08-07 PROCEDURE — 3078F DIAST BP <80 MM HG: CPT | Performed by: NURSE PRACTITIONER

## 2024-08-07 PROCEDURE — 36415 COLL VENOUS BLD VENIPUNCTURE: CPT

## 2024-08-07 PROCEDURE — 1160F RVW MEDS BY RX/DR IN RCRD: CPT | Performed by: NURSE PRACTITIONER

## 2024-08-07 PROCEDURE — 80061 LIPID PANEL: CPT | Performed by: NURSE PRACTITIONER

## 2024-08-07 PROCEDURE — 1126F AMNT PAIN NOTED NONE PRSNT: CPT | Performed by: NURSE PRACTITIONER

## 2024-08-07 PROCEDURE — 99214 OFFICE O/P EST MOD 30 MIN: CPT | Performed by: NURSE PRACTITIONER

## 2024-08-07 PROCEDURE — 83036 HEMOGLOBIN GLYCOSYLATED A1C: CPT

## 2024-08-07 PROCEDURE — 1159F MED LIST DOCD IN RCRD: CPT | Performed by: NURSE PRACTITIONER

## 2024-08-07 RX ORDER — CITALOPRAM 20 MG/1
20 TABLET ORAL DAILY
Qty: 90 TABLET | Refills: 0 | Status: SHIPPED | OUTPATIENT
Start: 2024-08-07

## 2024-08-07 RX ORDER — ATORVASTATIN CALCIUM 40 MG/1
20 TABLET, FILM COATED ORAL NIGHTLY
Qty: 90 TABLET | Refills: 1 | Status: SHIPPED | OUTPATIENT
Start: 2024-08-07

## 2024-08-07 RX ORDER — MELOXICAM 15 MG/1
15 TABLET ORAL DAILY PRN
Qty: 90 TABLET | Refills: 1 | Status: SHIPPED | OUTPATIENT
Start: 2024-08-07

## 2024-08-07 RX ORDER — AMLODIPINE BESYLATE 5 MG/1
5 TABLET ORAL DAILY
Qty: 90 TABLET | Refills: 1 | Status: SHIPPED | OUTPATIENT
Start: 2024-08-07

## 2024-08-07 RX ORDER — VALSARTAN 320 MG/1
320 TABLET ORAL DAILY
Qty: 90 TABLET | Refills: 0 | Status: SHIPPED | OUTPATIENT
Start: 2024-08-07

## 2024-08-07 NOTE — ASSESSMENT & PLAN NOTE
Restart Celexa, advised is using THC, to try the gummies, consider ambien again if he is not improving with his anxiety and then his sleep

## 2024-08-07 NOTE — ASSESSMENT & PLAN NOTE
Reviewed labs, he has a rx for ozempic at home he asked about, he has a family history of diabetes, will check an A1C

## 2024-08-07 NOTE — ASSESSMENT & PLAN NOTE
Continue current medication and efforts with diet and exercise.   Will check a lipid panel today, reviewed recent labs

## 2024-08-07 NOTE — ASSESSMENT & PLAN NOTE
Hypertension is stable. Advised to monitor BP at home. Continue current meds. Continue to modify diet and lifestyle.

## 2024-08-07 NOTE — PROGRESS NOTES
Chief Complaint  Hypertension (6 month follow up HTN, HLD, and insomnia )    Subjective          Kendrick Vargas presents to [facility]    History of Present Illness  Hyperlipidemia  Current medication: lipitor   Tolerating medication: Yes  Needs Refill: Yes crume    Lab Results       Component                Value               Date                       CHOL                     97                  02/07/2024                 TRIG                     94                  02/07/2024                 HDL                      39 (L)              02/07/2024                 LDL                      40                  02/07/2024                Hypertension:  Current medication: diovan & norvasc  Tolerating Medication: Yes  Checking BP at home and it is: not checking   Needs refills: Yes crume  Labs:  Lab Results       Component                Value               Date                       GLUCOSE                  145 (H)             07/26/2024                 BUN                      17                  07/26/2024                 CREATININE               1.08                07/26/2024                 EGFRIFNONA               63                  12/09/2021                 EGFRIFAFRI               >60                 11/19/2020                 BCR                      15.7                07/26/2024                 K                        4.6                 07/26/2024                 CO2                      26.4                07/26/2024                 CALCIUM                  9.3                 07/26/2024                 ALBUMIN                  4.2                 07/26/2024                 LABIL2                   1.1 (L)             01/11/2021                 AST                      20                  07/26/2024                 ALT                      19                  07/26/2024              Joint pain, on mobic, needs refills     Anxiety/ Depression/ Insomnia  Current  medication: was on celexa   Tolerating medication: Yes he did   Stressors: coaching   Current symptoms: anxiety is a little better but would like to try Celexa again (has used ambien and now using THC for sleep, that helps)     Past Medical History changes since 2024 :       Hospitalizations CAD         Heat related issues/ syncope in the past /saw David after the episodes             hearing loss bilateral, wears hearing aids         Surgical History:     23 CLN, at [facility]     heart cath :     Joint Replacement: RIGHT KNEE;;; 20; sixth surgery;     Vasectomy         Family History:       Father:  at age 73; Cause of death was liver cancer     Mother:  at age 75; Cause of death was lung cancer;  Type 2 Diabetes     Brother(s): 1 brother(s) total; 1 ;  GSW at 30     Sister(s): Healthy; 1 sister(s) total     Son(s): 2 son(s) total;  Obesity     Daughter(s): 1 daughter(s) total         Social History:       Occupation: [employer]. Retired (Prior occupation: teacher/)     Marital Status:  ( and remarried)     Children: 3 children and 2 step-children     Hobbies/Recreation: farming/asst              Past Medical History:   Diagnosis Date    Allergic rhinitis, unspecified     Chronic low back pain     Essential (primary) hypertension     Hearing loss, bilateral     wears hearing aids    BRIELLE on CPAP     Other insomnia     Other long term (current) drug therapy     Testicular hypofunction     Transient visual loss, bilateral     Unilateral primary osteoarthritis, right knee        No Known Allergies     Past Surgical History:   Procedure Laterality Date    COLONOSCOPY W/ BIOPSIES N/A 2023    JOINT REPLACEMENT Right 2020    sixth surgery    KNEE SURGERY Right     Right knee OA. removal of RTKR, deep implant removal    VASECTOMY          Social History     Tobacco Use    Smoking status: Never     Passive exposure: Past    Smokeless  tobacco: Never    Tobacco comments:     Reports chewing 2mg nicotine gum/ 15 per day for 15 years   Substance Use Topics    Alcohol use: Not Currently       Family History   Problem Relation Age of Onset    Lung cancer Mother     Diabetes type II Mother     Liver cancer Father     Obesity Son         Health Maintenance Due   Topic Date Due    TDAP/TD VACCINES (1 - Tdap) Never done    ZOSTER VACCINE (1 of 2) Never done    ANNUAL WELLNESS VISIT  04/20/2024    INFLUENZA VACCINE  08/01/2024        Current Outpatient Medications on File Prior to Visit   Medication Sig    azelastine (ASTELIN) 0.1 % nasal spray 2 sprays into the nostril(s) as directed by provider 2 (Two) Times a Day. Use in each nostril as directed    tadalafil (CIALIS) 5 MG tablet Take 1 tablet by mouth Daily.    [DISCONTINUED] amLODIPine (NORVASC) 5 MG tablet Take 1 tablet by mouth Daily.    [DISCONTINUED] atorvastatin (LIPITOR) 40 MG tablet Take 0.5 tablets by mouth Every Night.    [DISCONTINUED] meloxicam (MOBIC) 15 MG tablet TAKE 1 TABLET BY MOUTH ONCE DAILY AS NEEDED    [DISCONTINUED] valsartan (DIOVAN) 320 MG tablet Take 1 tablet by mouth Daily.     Current Facility-Administered Medications on File Prior to Visit   Medication    nitroglycerin (NITROSTAT) SL tablet 0.4 mg       Immunization History   Administered Date(s) Administered    COVID-19 (MODERNA) 1st,2nd,3rd Dose Monovalent 03/17/2021, 04/14/2021, 11/22/2021    COVID-19 (UNSPECIFIED) 03/17/2021, 04/14/2021, 11/22/2021    Fluzone (or Fluarix & Flulaval for VFC) >6mos 11/05/2022    Fluzone High Dose =>65 Years ([employer] ONLY) 11/05/2022    Fluzone High-Dose 65+yrs 12/09/2021    Pneumococcal Conjugate 20-Valent (PCV20) 04/20/2023    Pneumococcal Polysaccharide (PPSV23) 04/14/2022       Review of Systems   Constitutional:  Negative for fever.   Respiratory:  Negative for cough and shortness of breath.    Cardiovascular:  Negative for chest pain, palpitations and leg swelling.        Objective  "    Vitals:    08/07/24 0846   BP: 121/68   BP Location: Left arm   Patient Position: Sitting   Cuff Size: Large Adult   Pulse: 50   Temp: 97.4 °F (36.3 °C)   TempSrc: Oral   Weight: 93.4 kg (206 lb)   Height: 168.9 cm (66.5\")            Physical Exam  Vitals reviewed.   Constitutional:       General: He is not in acute distress.     Appearance: Normal appearance.   Neck:      Vascular: No carotid bruit.   Cardiovascular:      Rate and Rhythm: Normal rate and regular rhythm.      Heart sounds: Normal heart sounds. No murmur heard.  Pulmonary:      Effort: Pulmonary effort is normal. No respiratory distress.      Breath sounds: Normal breath sounds.   Musculoskeletal:      Right lower leg: No edema.      Left lower leg: No edema.   Neurological:      Mental Status: He is alert.   Psychiatric:         Mood and Affect: Mood normal.         Behavior: Behavior normal.         Result Review :     The following data was reviewed by: TY Ornelas on 08/07/2024:                       Assessment and Plan      Diagnoses and all orders for this visit:    1. Arthralgia, unspecified joint (Primary)  Assessment & Plan:  Can continue mobic, reviewed recent labs     Orders:  -     meloxicam (MOBIC) 15 MG tablet; Take 1 tablet by mouth Daily As Needed for Moderate Pain.  Dispense: 90 tablet; Refill: 1    2. Essential hypertension  Assessment & Plan:  Hypertension is stable. Advised to monitor BP at home. Continue current meds. Continue to modify diet and lifestyle.     Orders:  -     Lipid Panel  -     valsartan (DIOVAN) 320 MG tablet; Take 1 tablet by mouth Daily.  Dispense: 90 tablet; Refill: 0  -     amLODIPine (NORVASC) 5 MG tablet; Take 1 tablet by mouth Daily.  Dispense: 90 tablet; Refill: 1    3. Mixed hyperlipidemia  Assessment & Plan:   Continue current medication and efforts with diet and exercise.   Will check a lipid panel today, reviewed recent labs     Orders:  -     Lipid Panel  -     atorvastatin " (LIPITOR) 40 MG tablet; Take 0.5 tablets by mouth Every Night.  Dispense: 90 tablet; Refill: 1    4. Elevated blood sugar  Assessment & Plan:  Reviewed labs, he has a rx for ozempic at home he asked about, he has a family history of diabetes, will check an A1C    Orders:  -     Hemoglobin A1c; Future    5. Other mixed anxiety disorders  Assessment & Plan:  Restart Celexa, advised is using THC, to try the gummies, consider ambien again if he is not improving with his anxiety and then his sleep     Orders:  -     citalopram (CeleXA) 20 MG tablet; Take 1 tablet by mouth Daily.  Dispense: 90 tablet; Refill: 0                  Follow Up     Return if symptoms worsen or fail to improve, for followup pending lab results.    Patient was given instructions and counseling regarding his condition or for health maintenance advice. Please see specific information pulled into the AVS if appropriate.

## 2024-11-05 DIAGNOSIS — F41.3 OTHER MIXED ANXIETY DISORDERS: ICD-10-CM

## 2024-11-05 RX ORDER — CITALOPRAM HYDROBROMIDE 20 MG/1
20 TABLET ORAL DAILY
Qty: 90 TABLET | Refills: 0 | Status: SHIPPED | OUTPATIENT
Start: 2024-11-05

## 2024-11-12 DIAGNOSIS — I10 ESSENTIAL HYPERTENSION: ICD-10-CM

## 2024-11-12 RX ORDER — VALSARTAN 320 MG/1
320 TABLET ORAL DAILY
Qty: 90 TABLET | Refills: 0 | Status: SHIPPED | OUTPATIENT
Start: 2024-11-12

## 2024-12-19 ENCOUNTER — TELEPHONE (OUTPATIENT)
Dept: FAMILY MEDICINE CLINIC | Age: 69
End: 2024-12-19
Payer: MEDICARE

## 2024-12-19 NOTE — TELEPHONE ENCOUNTER
"Caller: Kendrick Vargas \"Esteban\"    Relationship: Self    Best call back number: [phone]     What was the call regarding: PATIENT STATES HE SPOKE WITH HIS UROLOGIST ABOUT AN INABILITY TO EJACULATE, AND HIS UROLOGIST INFORMED HIM THAT IT WAS LIKELY CAUSED BY HIS CITALOPRAM. PATIENT STATES HE WOULD LIKE TO COME OFF OF THIS MEDICATION BUT WANTED TO KNOW WHAT THE PROPER PROCEDURE WAS FOR THAT. PATIENT STATES TO PLEASE CALL HIM AND LET HIM KNOW WHAT TO DO TO PROPERLY STOP TAKING THIS MEDICATION.   "

## 2024-12-20 NOTE — TELEPHONE ENCOUNTER
If he wants to stop celexa, he would need to taper, start by taking half tab for a week or two then every other day for a few weeks, then can stop and see how he does

## 2025-01-24 DIAGNOSIS — M25.50 ARTHRALGIA, UNSPECIFIED JOINT: ICD-10-CM

## 2025-01-25 RX ORDER — MELOXICAM 15 MG/1
TABLET ORAL
Qty: 30 TABLET | Refills: 0 | Status: SHIPPED | OUTPATIENT
Start: 2025-01-25

## 2025-02-12 ENCOUNTER — LAB (OUTPATIENT)
Dept: LAB | Facility: HOSPITAL | Age: 70
End: 2025-02-12
Payer: MEDICARE

## 2025-02-12 ENCOUNTER — OFFICE VISIT (OUTPATIENT)
Dept: FAMILY MEDICINE CLINIC | Age: 70
End: 2025-02-12
Payer: MEDICARE

## 2025-02-12 VITALS
BODY MASS INDEX: 32.74 KG/M2 | HEART RATE: 56 BPM | OXYGEN SATURATION: 95 % | DIASTOLIC BLOOD PRESSURE: 75 MMHG | WEIGHT: 208.6 LBS | HEIGHT: 67 IN | SYSTOLIC BLOOD PRESSURE: 119 MMHG

## 2025-02-12 DIAGNOSIS — I10 ESSENTIAL HYPERTENSION: ICD-10-CM

## 2025-02-12 DIAGNOSIS — E78.2 MIXED HYPERLIPIDEMIA: ICD-10-CM

## 2025-02-12 DIAGNOSIS — R73.03 PRE-DIABETES: ICD-10-CM

## 2025-02-12 DIAGNOSIS — Z00.00 ROUTINE GENERAL MEDICAL EXAMINATION AT A HEALTH CARE FACILITY: Primary | ICD-10-CM

## 2025-02-12 DIAGNOSIS — Z00.00 ROUTINE GENERAL MEDICAL EXAMINATION AT A HEALTH CARE FACILITY: ICD-10-CM

## 2025-02-12 DIAGNOSIS — F41.3 OTHER MIXED ANXIETY DISORDERS: ICD-10-CM

## 2025-02-12 DIAGNOSIS — M25.50 ARTHRALGIA, UNSPECIFIED JOINT: ICD-10-CM

## 2025-02-12 LAB
ALBUMIN SERPL-MCNC: 4.4 G/DL (ref 3.5–5.2)
ALBUMIN/GLOB SERPL: 1.3 G/DL
ALP SERPL-CCNC: 145 U/L (ref 39–117)
ALT SERPL W P-5'-P-CCNC: 26 U/L (ref 1–41)
ANION GAP SERPL CALCULATED.3IONS-SCNC: 9 MMOL/L (ref 5–15)
AST SERPL-CCNC: 26 U/L (ref 1–40)
BASOPHILS # BLD AUTO: 0.03 10*3/MM3 (ref 0–0.2)
BASOPHILS NFR BLD AUTO: 0.4 % (ref 0–1.5)
BILIRUB SERPL-MCNC: 0.5 MG/DL (ref 0–1.2)
BUN SERPL-MCNC: 19 MG/DL (ref 8–23)
BUN/CREAT SERPL: 15.2 (ref 7–25)
CALCIUM SPEC-SCNC: 9.7 MG/DL (ref 8.6–10.5)
CHLORIDE SERPL-SCNC: 101 MMOL/L (ref 98–107)
CHOLEST SERPL-MCNC: 102 MG/DL (ref 0–200)
CO2 SERPL-SCNC: 26 MMOL/L (ref 22–29)
CREAT SERPL-MCNC: 1.25 MG/DL (ref 0.76–1.27)
DEPRECATED RDW RBC AUTO: 43.6 FL (ref 37–54)
EGFRCR SERPLBLD CKD-EPI 2021: 62.7 ML/MIN/1.73
EOSINOPHIL # BLD AUTO: 0.33 10*3/MM3 (ref 0–0.4)
EOSINOPHIL NFR BLD AUTO: 4.2 % (ref 0.3–6.2)
ERYTHROCYTE [DISTWIDTH] IN BLOOD BY AUTOMATED COUNT: 12.8 % (ref 12.3–15.4)
GLOBULIN UR ELPH-MCNC: 3.3 GM/DL
GLUCOSE SERPL-MCNC: 113 MG/DL (ref 65–99)
HBA1C MFR BLD: 6.1 % (ref 4.8–5.6)
HCT VFR BLD AUTO: 44.8 % (ref 37.5–51)
HDLC SERPL-MCNC: 41 MG/DL (ref 40–60)
HGB BLD-MCNC: 14.8 G/DL (ref 13–17.7)
IMM GRANULOCYTES # BLD AUTO: 0.03 10*3/MM3 (ref 0–0.05)
IMM GRANULOCYTES NFR BLD AUTO: 0.4 % (ref 0–0.5)
LDLC SERPL CALC-MCNC: 49 MG/DL (ref 0–100)
LDLC/HDLC SERPL: 1.24 {RATIO}
LYMPHOCYTES # BLD AUTO: 2.26 10*3/MM3 (ref 0.7–3.1)
LYMPHOCYTES NFR BLD AUTO: 28.9 % (ref 19.6–45.3)
MCH RBC QN AUTO: 30.3 PG (ref 26.6–33)
MCHC RBC AUTO-ENTMCNC: 33 G/DL (ref 31.5–35.7)
MCV RBC AUTO: 91.6 FL (ref 79–97)
MONOCYTES # BLD AUTO: 0.69 10*3/MM3 (ref 0.1–0.9)
MONOCYTES NFR BLD AUTO: 8.8 % (ref 5–12)
NEUTROPHILS NFR BLD AUTO: 4.47 10*3/MM3 (ref 1.7–7)
NEUTROPHILS NFR BLD AUTO: 57.3 % (ref 42.7–76)
PLATELET # BLD AUTO: 215 10*3/MM3 (ref 140–450)
PMV BLD AUTO: 10.2 FL (ref 6–12)
POTASSIUM SERPL-SCNC: 5.1 MMOL/L (ref 3.5–5.2)
PROT SERPL-MCNC: 7.7 G/DL (ref 6–8.5)
RBC # BLD AUTO: 4.89 10*6/MM3 (ref 4.14–5.8)
SODIUM SERPL-SCNC: 136 MMOL/L (ref 136–145)
TRIGL SERPL-MCNC: 50 MG/DL (ref 0–150)
TSH SERPL DL<=0.05 MIU/L-ACNC: 2.53 UIU/ML (ref 0.27–4.2)
VLDLC SERPL-MCNC: 12 MG/DL (ref 5–40)
WBC NRBC COR # BLD AUTO: 7.81 10*3/MM3 (ref 3.4–10.8)

## 2025-02-12 PROCEDURE — 80061 LIPID PANEL: CPT

## 2025-02-12 PROCEDURE — 83036 HEMOGLOBIN GLYCOSYLATED A1C: CPT

## 2025-02-12 PROCEDURE — 85025 COMPLETE CBC W/AUTO DIFF WBC: CPT

## 2025-02-12 PROCEDURE — 1160F RVW MEDS BY RX/DR IN RCRD: CPT | Performed by: NURSE PRACTITIONER

## 2025-02-12 PROCEDURE — 80053 COMPREHEN METABOLIC PANEL: CPT

## 2025-02-12 PROCEDURE — 1159F MED LIST DOCD IN RCRD: CPT | Performed by: NURSE PRACTITIONER

## 2025-02-12 PROCEDURE — G0439 PPPS, SUBSEQ VISIT: HCPCS | Performed by: NURSE PRACTITIONER

## 2025-02-12 PROCEDURE — 3074F SYST BP LT 130 MM HG: CPT | Performed by: NURSE PRACTITIONER

## 2025-02-12 PROCEDURE — 36415 COLL VENOUS BLD VENIPUNCTURE: CPT

## 2025-02-12 PROCEDURE — 1126F AMNT PAIN NOTED NONE PRSNT: CPT | Performed by: NURSE PRACTITIONER

## 2025-02-12 PROCEDURE — 84443 ASSAY THYROID STIM HORMONE: CPT

## 2025-02-12 PROCEDURE — 3078F DIAST BP <80 MM HG: CPT | Performed by: NURSE PRACTITIONER

## 2025-02-12 PROCEDURE — 1170F FXNL STATUS ASSESSED: CPT | Performed by: NURSE PRACTITIONER

## 2025-02-12 RX ORDER — BUPROPION HYDROCHLORIDE 150 MG/1
150 TABLET ORAL DAILY
Qty: 30 TABLET | Refills: 2 | Status: SHIPPED | OUTPATIENT
Start: 2025-02-12

## 2025-02-12 RX ORDER — ACETAMINOPHEN 500 MG
500 TABLET ORAL EVERY 6 HOURS PRN
COMMUNITY
End: 2025-02-12

## 2025-02-12 RX ORDER — TESTOSTERONE CYPIONATE 200 MG/ML
200 INJECTION, SOLUTION INTRAMUSCULAR
COMMUNITY
End: 2025-02-12 | Stop reason: ALTCHOICE

## 2025-02-12 RX ORDER — MELOXICAM 15 MG/1
TABLET ORAL
Qty: 90 TABLET | Refills: 0 | Status: SHIPPED | OUTPATIENT
Start: 2025-02-12

## 2025-02-12 RX ORDER — SILDENAFIL 25 MG/1
25 TABLET, FILM COATED ORAL
COMMUNITY
End: 2025-02-12 | Stop reason: ALTCHOICE

## 2025-02-12 RX ORDER — AMLODIPINE BESYLATE 2.5 MG/1
2.5 TABLET ORAL DAILY
COMMUNITY

## 2025-02-12 RX ORDER — VALSARTAN 320 MG/1
320 TABLET ORAL DAILY
Qty: 90 TABLET | Refills: 0 | Status: SHIPPED | OUTPATIENT
Start: 2025-02-12

## 2025-02-12 RX ORDER — CLOPIDOGREL BISULFATE 75 MG/1
75 TABLET ORAL DAILY
COMMUNITY
End: 2025-02-12 | Stop reason: ALTCHOICE

## 2025-02-12 NOTE — ASSESSMENT & PLAN NOTE
Advise regular exercise, healthy eating, advised to always wear seat belts. Living will discussed, booklet given to bring a copy, fall prevention discussed.  Immunizations discussed, he declines any updates today.  Advised to check with his pharmacy on coverage of Tdap and shingrex.   To continue yearly optometry and dental exams.    Advised to stop chewing nicotine gum   Did not recheck a PSA today, he reports that his urologist checks this lab

## 2025-02-12 NOTE — PROGRESS NOTES
Subjective   The ABCs of the Annual Wellness Visit  Medicare Wellness Visit      Kendrick Vargas is a 69 y.o. patient who presents for a Medicare Wellness Visit.    Medicare wellness HPI  Exercises regularly: yes   Eats healthy: tries, asked about ozempic   Last PSA:not sure, sees a urologist   Wears seatbelts:not usually   Living will:thinks   Optometrist:Dr Patel, last appt rescheduled   Dentist:Dr De León   Tobacco:nicotine gum   Alcohol intake: none   6-27-23  no polyps, repeat in 10 years at [facility]   Drugs:none, tried THC gummies   Falls:none   Colonoscopy: cologuard neg 3-23-21and CLN 6-17-23  Immunizations:had 2 pneum vaccines     The following portions of the patient's history were reviewed and   updated as appropriate: allergies, current medications, past family history, past medical history, past social history, past surgical history, and problem list.    Compared to one year ago, the patient's physical   health is the same.  Compared to one year ago, the patient's mental   health is worse.    Recent Hospitalizations:  He was not admitted to the hospital during the last year.     Current Medical Providers:  Patient Care Team:  Swathi De La Cruz APRN as PCP - General (Family Medicine)  Rene Roman DO as Consulting Physician (Cardiac Electrophysiology)    Outpatient Medications Prior to Visit   Medication Sig Dispense Refill    amLODIPine (NORVASC) 2.5 MG tablet Take 1 tablet by mouth Daily.      atorvastatin (LIPITOR) 40 MG tablet Take 0.5 tablets by mouth Every Night. (Patient taking differently: Take 2 tablets by mouth Every Night.) 90 tablet 1    melatonin 5 MG tablet tablet Take  by mouth.      clopidogrel (PLAVIX) 75 MG tablet Take 1 tablet by mouth Daily.      Melatonin 5 MG capsule Take 1 tablet by mouth Daily.      meloxicam (MOBIC) 15 MG tablet TAKE 1 TABLET BY MOUTH ONCE DAILY AS NEEDED FOR moderate pain 30 tablet 0    valsartan (DIOVAN) 320 MG tablet TAKE 1 TABLET BY MOUTH ONCE  DAILY 90 tablet 0    tadalafil (CIALIS) 5 MG tablet Take 1 tablet by mouth Daily. (Patient not taking: Reported on 2/12/2025)      acetaminophen (TYLENOL) 500 MG tablet Take 1 tablet by mouth Every 6 (Six) Hours As Needed. (Patient not taking: Reported on 2/12/2025)      amLODIPine (NORVASC) 5 MG tablet Take 1 tablet by mouth Daily. 90 tablet 1    azelastine (ASTELIN) 0.1 % nasal spray 2 sprays into the nostril(s) as directed by provider 2 (Two) Times a Day. Use in each nostril as directed (Patient not taking: Reported on 2/12/2025) 30 mL 2    citalopram (CeleXA) 20 MG tablet TAKE 1 TABLET BY MOUTH ONCE DAILY (Patient not taking: Reported on 2/12/2025) 90 tablet 0    sildenafil (VIAGRA) 25 MG tablet Take 1 tablet by mouth. (Patient not taking: Reported on 2/12/2025)      Testosterone Cypionate (DEPOTESTOTERONE CYPIONATE) 200 MG/ML injection Infuse 1 mL into a venous catheter. (Patient not taking: Reported on 2/12/2025)       Facility-Administered Medications Prior to Visit   Medication Dose Route Frequency Provider Last Rate Last Admin    nitroglycerin (NITROSTAT) SL tablet 0.4 mg  0.4 mg Sublingual Q5 Min PRN Swathi Park APRN   0.4 mg at 10/24/22 1145     No opioid medication identified on active medication list. I have reviewed chart for other potential  high risk medication/s and harmful drug interactions in the elderly.      Aspirin is not on active medication list.  Aspirin use is indicated based on review of current medical condition/s. Pros and cons of this therapy have been discussed with this patient. Benefits of this medication outweigh potential harm.  Patient has been instructed to start taking this medication..    Patient Active Problem List   Diagnosis    Essential hypertension    Testosterone deficiency in male    Allergic rhinitis due to allergen    Other fatigue    Routine general medical examination at a health care facility    Need for pneumococcal vaccine    CAD S/P percutaneous coronary  "angioplasty    ST elevation myocardial infarction (STEMI)    Arthralgia    Other mixed anxiety disorders    Mixed hyperlipidemia    Other insomnia    Immunization due    Screening for viral disease    Screening for prostate cancer    Low libido    Erectile dysfunction    Elevated blood sugar    Pre-diabetes     Advance Care Planning Advance Directive is not on file.  ACP discussion was held with the patient during this visit. Patient has an advance directive (not in EMR), copy requested.            Objective   Vitals:    02/12/25 0840   BP: 119/75   BP Location: Right arm   Patient Position: Sitting   Pulse: 56   SpO2: 95%  Comment: room air   Weight: 94.6 kg (208 lb 9.6 oz)   Height: 168.9 cm (66.5\")   PainSc: 0-No pain       Estimated body mass index is 33.16 kg/m² as calculated from the following:    Height as of this encounter: 168.9 cm (66.5\").    Weight as of this encounter: 94.6 kg (208 lb 9.6 oz).    BMI is >= 30 and <35. (Class 1 Obesity). The following options were offered after discussion;: exercise counseling/recommendations and nutrition counseling/recommendations           Does the patient have evidence of cognitive impairment? No                                                                                                Health  Risk Assessment    Smoking Status:  Social History     Tobacco Use   Smoking Status Never    Passive exposure: Past   Smokeless Tobacco Never   Tobacco Comments    Reports chewing 2mg nicotine gum/ 15 per day for 15 years     Alcohol Consumption:  Social History     Substance and Sexual Activity   Alcohol Use Not Currently       Fall Risk Screen  STEADI Fall Risk Assessment was completed, and patient is at LOW risk for falls.Assessment completed on:2/12/2025    Depression Screening   Little interest or pleasure in doing things? Not at all   Feeling down, depressed, or hopeless? Not at all   PHQ-2 Total Score 0      Health Habits and Functional and Cognitive Screening:      " 2/12/2025     8:00 AM   Functional & Cognitive Status   Do you have difficulty preparing food and eating? No   Do you have difficulty bathing yourself, getting dressed or grooming yourself? No   Do you have difficulty using the toilet? No   Do you have difficulty moving around from place to place? No   Do you have trouble with steps or getting out of a bed or a chair? No   Current Diet Well Balanced Diet   Dental Exam Up to date   Eye Exam Up to date   Exercise (times per week) 4 times per week   Current Exercises Include Walking   Do you need help using the phone?  No   Are you deaf or do you have serious difficulty hearing?  No   Do you need help to go to places out of walking distance? No   Do you need help shopping? No   Do you need help preparing meals?  No   Do you need help with housework?  No   Do you need help with laundry? No   Do you need help taking your medications? No   Do you need help managing money? No   Do you ever drive or ride in a car without wearing a seat belt? Yes   Have you felt unusual stress, anger or loneliness in the last month? No   Who do you live with? Spouse   If you need help, do you have trouble finding someone available to you? No   Have you been bothered in the last four weeks by sexual problems? No   Do you have difficulty concentrating, remembering or making decisions? No           Age-appropriate Screening Schedule:  Refer to the list below for future screening recommendations based on patient's age, sex and/or medical conditions. Orders for these recommended tests are listed in the plan section. The patient has been provided with a written plan.    Health Maintenance List  Health Maintenance   Topic Date Due    TDAP/TD VACCINES (1 - Tdap) Never done    ZOSTER VACCINE (1 of 2) Never done    INFLUENZA VACCINE  07/01/2024    COVID-19 Vaccine (7 - 2024-25 season) 09/01/2024    LIPID PANEL  08/07/2025    ANNUAL WELLNESS VISIT  02/12/2026    BMI FOLLOWUP  02/12/2026    COLORECTAL  CANCER SCREENING  06/17/2033    HEPATITIS C SCREENING  Completed    Pneumococcal Vaccine 50+  Completed                                                                                                                                                Gait and Balance Evaluation: Normal  CMS Preventative Services Quick Reference  Risk Factors Identified During Encounter  Immunizations Discussed/Encouraged: Tdap, Influenza, Shingrix, and COVID19    The above risks/problems have been discussed with the patient.  Pertinent information has been shared with the patient in the After Visit Summary.  An After Visit Summary and PPPS were made available to the patient.    Follow Up:   Next Medicare Wellness visit to be scheduled in 1 year.          Additional E&M Note during same encounter follows:  Patient has multiple medical problems which are significant and separately identifiable that require additional work above and beyond the Medicare Wellness Visit.      Chief Complaint  Medicare Wellness-subsequent (Refill valsartan/)    Kendrick Vargas is a 69 y.o. male who presents to [facility]     Hyperlipidemia  Current medication: lipitor   Tolerating medication: Yes  Needs Refill: no, he gets from cardiology and they have him on 80 mg and give him a year at at time    Lab Results       Component                Value               Date                       CHOL                     72                  08/07/2024                 TRIG                     37                  08/07/2024                 HDL                      34 (L)              08/07/2024                 LDL                      27                  08/07/2024                Hypertension:  Current medication: diovan, norvasc   Tolerating Medication: Yes  Sees cardiology, now only taking 1 anti platelet, 81 mg ASA daily   Needs refills: no to novrasc, yes valsartan out of today to matt   Labs:  Lab Results       Component                 Value               Date                       GLUCOSE                  145 (H)             2024                 BUN                      17                  2024                 CREATININE               1.08                2024                 EGFRIFNONA               63                  2021                 EGFRIFAFRI               >60                 2020                 BCR                      15.7                2024                 K                        4.6                 2024                 CO2                      26.4                2024                 CALCIUM                  9.3                 2024                 ALBUMIN                  4.2                 2024                 LABIL2                   1.1 (L)             2021                 AST                      20                  2024                 ALT                      19                  2024              Joint pains on mobic yes refills need     Anxiety/ Depression/ Insomnia  Current medication: none was on celexa  Tolerating medication: no, urology and he discussed effect on his ED/Libido, stopped and that issue has resolved   Stressors: coaching, farming   Current symptoms: irritable      Past Medical History changes since 2024 :       Hospitalizations CAD -        Heat related issues/ syncope in the past /saw David after the episodes             hearing loss bilateral, wears hearing aids         Surgical History:      23 CLN, at [facility]     heart cath :     Joint Replacement: RIGHT KNEE;;; 20; sixth surgery;     Vasectomy         Family History:        Father:  at age 73; Cause of death was liver cancer     Mother:  at age 75; Cause of death was lung cancer;  Type 2 Diabetes     Brother(s): 1 brother(s) total; 1 ;  GSW at 30     Sister(s): Healthy; 1 sister(s) total     Son(s): 2 son(s) total;  Obesity      "Daughter(s): 1 daughter(s) total         Social History:        Occupation: [employer]. Retired (Prior occupation: teacher/) raises sheep     Marital Status:  ( and remarried)     Children: 3 children and 2 step-children     Hobbies/Recreation: farming/asst                      Objective   Vital Signs:   Vitals:    02/12/25 0840   BP: 119/75   BP Location: Right arm   Patient Position: Sitting   Pulse: 56   SpO2: 95%  Comment: room air   Weight: 94.6 kg (208 lb 9.6 oz)   Height: 168.9 cm (66.5\")   PainSc: 0-No pain     Body mass index is 33.16 kg/m².    Wt Readings from Last 3 Encounters:   02/12/25 94.6 kg (208 lb 9.6 oz)   08/07/24 93.4 kg (206 lb)   07/26/24 94.8 kg (209 lb)     BP Readings from Last 3 Encounters:   02/12/25 119/75   08/07/24 121/68   07/26/24 122/76       Review of Systems   Constitutional:  Negative for activity change, appetite change, chills, fatigue and fever.   HENT:  Positive for hearing loss (he wears hearing aids that help). Negative for congestion.    Eyes:  Negative for visual disturbance.   Respiratory:  Negative for cough, shortness of breath and wheezing.    Cardiovascular:  Negative for chest pain, palpitations and leg swelling.   Gastrointestinal:  Negative for abdominal pain, constipation, diarrhea, nausea and vomiting.   Genitourinary:  Negative for difficulty urinating.        No ED   Musculoskeletal:  Positive for arthralgias. Negative for myalgias.   Skin:  Negative for rash.   Neurological:  Negative for dizziness, weakness and numbness.   Psychiatric/Behavioral:  Positive for sleep disturbance (has issues, takes melatonin sometimes). Negative for confusion and suicidal ideas. The patient is nervous/anxious (irritable).         Physical Exam  Vitals reviewed.   Constitutional:       Appearance: Normal appearance. He is well-developed.   HENT:      Head: Normocephalic and atraumatic.      Right Ear: External ear normal.      Left Ear: " External ear normal.      Mouth/Throat:      Pharynx: No oropharyngeal exudate.   Eyes:      Conjunctiva/sclera: Conjunctivae normal.      Pupils: Pupils are equal, round, and reactive to light.   Cardiovascular:      Rate and Rhythm: Normal rate and regular rhythm.      Heart sounds: No murmur heard.     No friction rub. No gallop.   Pulmonary:      Effort: Pulmonary effort is normal.      Breath sounds: Normal breath sounds. No wheezing or rhonchi.   Abdominal:      General: Bowel sounds are normal. There is no distension.      Palpations: Abdomen is soft.      Tenderness: There is no abdominal tenderness.      Comments:       Skin:     General: Skin is warm and dry.   Neurological:      Mental Status: He is alert and oriented to person, place, and time.      Cranial Nerves: No cranial nerve deficit.      Gait: Gait normal.   Psychiatric:         Mood and Affect: Mood and affect normal.         Behavior: Behavior normal.         Thought Content: Thought content normal.         Judgment: Judgment normal.         The following data was reviewed by TY Ornelas on 02/12/2025        Assessment & Plan   Diagnoses and all orders for this visit:    1. Routine general medical examination at a health care facility (Primary)  Assessment & Plan:  Advise regular exercise, healthy eating, advised to always wear seat belts. Living will discussed, booklet given to bring a copy, fall prevention discussed.  Immunizations discussed, he declines any updates today.  Advised to check with his pharmacy on coverage of Tdap and shingrex.   To continue yearly optometry and dental exams.    Advised to stop chewing nicotine gum   Did not recheck a PSA today, he reports that his urologist checks this lab     Orders:  -     Comprehensive Metabolic Panel; Future  -     CBC & Differential; Future  -     TSH; Future  -     Lipid Panel; Future  -     Hemoglobin A1c; Future    2. Essential hypertension  Assessment & Plan:  Reviewed  cardiology note, he continues norvasc 2.5 daily and refilled his valsartan, reports he took last dose yesterday, reviewed his vital signs, monitor bp at home and continue to follow up with his cardiologist     Orders:  -     valsartan (DIOVAN) 320 MG tablet; Take 1 tablet by mouth Daily.  Dispense: 90 tablet; Refill: 0    3. Other mixed anxiety disorders  Assessment & Plan:  Discussed rx and side effects and reviewed previous labs, give him a trial of wellbutrin XL     Orders:  -     buPROPion XL (Wellbutrin XL) 150 MG 24 hr tablet; Take 1 tablet by mouth Daily.  Dispense: 30 tablet; Refill: 2    4. Mixed hyperlipidemia  Assessment & Plan:   Rechecking lab, follow up with cardiology and to get his refills from cardiology if needed, reviewed his cardiology note from       5. Pre-diabetes  Assessment & Plan:  He has some ozempic at home that a family has not used, discussed rx, side effects and healthy eating and regular exercise, reviewed last labs, rechecking labs today (he actually reports wanting his son to take due to his obesity)     Orders:  -     Hemoglobin A1c; Future    6. Arthralgia, unspecified joint  Assessment & Plan:  continue mobic with food     Orders:  -     meloxicam (MOBIC) 15 MG tablet; Daily with food  Dispense: 90 tablet; Refill: 0          BMI is >= 30 and <35. (Class 1 Obesity). The following options were offered after discussion;: exercise counseling/recommendations and nutrition counseling/recommendations       FOLLOW UP  Return if symptoms worsen or fail to improve, for followup pending lab results.  Patient was given instructions and counseling regarding his condition or for health maintenance advice. Please see specific information pulled into the AVS if appropriate.     Swathi De La Cruz, APRN  02/12/25  10:52 EST

## 2025-02-12 NOTE — ASSESSMENT & PLAN NOTE
He has some ozempic at home that a family has not used, discussed rx, side effects and healthy eating and regular exercise, reviewed last labs, rechecking labs today (he actually reports wanting his son to take due to his obesity)

## 2025-02-12 NOTE — ASSESSMENT & PLAN NOTE
Reviewed cardiology note, he continues norvasc 2.5 daily and refilled his valsartan, reports he took last dose yesterday, reviewed his vital signs, monitor bp at home and continue to follow up with his cardiologist

## 2025-02-12 NOTE — ASSESSMENT & PLAN NOTE
Rechecking lab, follow up with cardiology and to get his refills from cardiology if needed, reviewed his cardiology note from

## 2025-04-03 ENCOUNTER — TELEPHONE (OUTPATIENT)
Dept: FAMILY MEDICINE CLINIC | Age: 70
End: 2025-04-03
Payer: MEDICARE

## 2025-04-03 DIAGNOSIS — G47.30 SLEEP APNEA, UNSPECIFIED TYPE: Primary | ICD-10-CM

## 2025-04-03 NOTE — TELEPHONE ENCOUNTER
Left a message that our office does not regulate and manage CPAP machines.  That is done by sleep medicine doctor or a pulmonologist.   If you need a new referral let us know.

## 2025-04-03 NOTE — TELEPHONE ENCOUNTER
"  Caller: Kendrick Vargas \"Esteban\"    Relationship: Self    Best call back number: [phone]     What is the best time to reach you: ANYTIME     Who are you requesting to speak with (clinical staff, provider,  specific staff member): CLINICAL     What was the call regarding: PATIENT IS CALLING STATING A SCRIPT IS NEEDED FOR THE C PAP MACHINE, [facility]   "

## 2025-04-03 NOTE — TELEPHONE ENCOUNTER
Pt said its been years ago that he had a sleep study done at [facility], Dr Forte.   Okay to place a referral?

## 2025-05-03 DIAGNOSIS — F41.3 OTHER MIXED ANXIETY DISORDERS: ICD-10-CM

## 2025-05-05 RX ORDER — BUPROPION HYDROCHLORIDE 150 MG/1
150 TABLET ORAL DAILY
Qty: 90 TABLET | Refills: 0 | Status: SHIPPED | OUTPATIENT
Start: 2025-05-05

## 2025-05-12 DIAGNOSIS — I10 ESSENTIAL HYPERTENSION: ICD-10-CM

## 2025-05-12 RX ORDER — VALSARTAN 320 MG/1
320 TABLET ORAL DAILY
Qty: 90 TABLET | Refills: 0 | Status: SHIPPED | OUTPATIENT
Start: 2025-05-12

## 2025-07-17 DIAGNOSIS — I10 ESSENTIAL HYPERTENSION: ICD-10-CM

## 2025-07-17 DIAGNOSIS — E78.2 MIXED HYPERLIPIDEMIA: ICD-10-CM

## 2025-07-17 RX ORDER — ATORVASTATIN CALCIUM 40 MG/1
20 TABLET, FILM COATED ORAL NIGHTLY
Qty: 90 TABLET | Refills: 1 | Status: CANCELLED | OUTPATIENT
Start: 2025-07-17

## 2025-07-17 RX ORDER — AMLODIPINE BESYLATE 5 MG/1
5 TABLET ORAL DAILY
Qty: 90 TABLET | Refills: 1 | Status: SHIPPED | OUTPATIENT
Start: 2025-07-17

## 2025-07-17 NOTE — TELEPHONE ENCOUNTER
Last visit in 2-2025 I reviewed his chart, last cardiology note  : atorvastatin (Lipitor) 80 MG tablet TAKE 1 TABLET(80 MG) BY MOUTH 1 TIME EACH DAY 90 tablet 3   So I did not refill.  He needs to talk to his cardiologist about his rx and refills for HLD/CAD

## 2025-07-17 NOTE — TELEPHONE ENCOUNTER
When I went to sign, says he is on norvasc 2.5 daily, if not take that off list, let's get her rx's straight and I will send, what he has been taking and what he needs to be on, just needs to be correct in chart

## 2025-07-17 NOTE — TELEPHONE ENCOUNTER
Called Dr Roman's cardiology office and ask them what dose atorvastatin he is supposed to be on.  Geraldo said she sees 80mg but doesn't see where he was told to cut it in half.  Told Geraldo that A De La Cruz would like to let cardiology manage the cholesterol medication.  She said okay.

## 2025-07-17 NOTE — TELEPHONE ENCOUNTER
Rx Refill Note  Requested Prescriptions     Pending Prescriptions Disp Refills    amLODIPine (NORVASC) 5 MG tablet [Pharmacy Med Name: amlodipine 5 mg tablet] 90 tablet 1     Sig: TAKE 1 TABLET BY MOUTH ONCE DAILY      Last office visit with prescribing clinician: 2/12/2025   Last telemedicine visit with prescribing clinician: Visit date not found   Next office visit with prescribing clinician: Visit date not found  Last refill by A De La Cruz 04/28/25, #90    Pt reports his atorvastatin was sent in as 40mg take 1/2 tablet daily but his cardiologist had originally sent it as 80mg take a whole tablet and it cause muscle aches so he had told him to to only take a 1/2 tablet.  That would have been 40mg.  He had ask A De La Cruz to refill it and somewhere it got confused.  He has been taking a whole 40mg tablet and so he has been running out.  Reviewed his last few labs and cholesterol was good. Told him I would check with A De La Cruz.     Called [facility] and pt has been requesting refills too soon because he told them he has been taking a whole tablet instead of a half as stated in the directions.  Insurance didn't want to pay for him to refill too soon.       Florencia Donovan LPN  07/17/25, 09:10 EDT

## 2025-08-13 DIAGNOSIS — I10 ESSENTIAL HYPERTENSION: ICD-10-CM

## 2025-08-13 RX ORDER — VALSARTAN 320 MG/1
320 TABLET ORAL DAILY
Qty: 30 TABLET | Refills: 0 | Status: SHIPPED | OUTPATIENT
Start: 2025-08-13

## 2025-08-15 ENCOUNTER — TELEPHONE (OUTPATIENT)
Dept: FAMILY MEDICINE CLINIC | Age: 70
End: 2025-08-15
Payer: MEDICARE

## 2025-08-27 ENCOUNTER — OFFICE VISIT (OUTPATIENT)
Dept: FAMILY MEDICINE CLINIC | Age: 70
End: 2025-08-27
Payer: MEDICARE

## 2025-08-27 VITALS
SYSTOLIC BLOOD PRESSURE: 126 MMHG | WEIGHT: 206.4 LBS | HEIGHT: 67 IN | HEART RATE: 56 BPM | DIASTOLIC BLOOD PRESSURE: 72 MMHG | TEMPERATURE: 98.4 F | BODY MASS INDEX: 32.39 KG/M2 | OXYGEN SATURATION: 96 %

## 2025-08-27 DIAGNOSIS — M25.50 ARTHRALGIA, UNSPECIFIED JOINT: ICD-10-CM

## 2025-08-27 DIAGNOSIS — I10 ESSENTIAL HYPERTENSION: Primary | ICD-10-CM

## 2025-08-27 DIAGNOSIS — E78.2 MIXED HYPERLIPIDEMIA: ICD-10-CM

## 2025-08-27 RX ORDER — AMLODIPINE BESYLATE 5 MG/1
5 TABLET ORAL DAILY
Qty: 90 TABLET | Refills: 1 | Status: SHIPPED | OUTPATIENT
Start: 2025-08-27

## 2025-08-27 RX ORDER — ATORVASTATIN CALCIUM 40 MG/1
40 TABLET, FILM COATED ORAL NIGHTLY
Qty: 90 TABLET | Refills: 1 | Status: SHIPPED | OUTPATIENT
Start: 2025-08-27

## 2025-08-27 RX ORDER — MELOXICAM 15 MG/1
TABLET ORAL
Qty: 90 TABLET | Refills: 1 | Status: SHIPPED | OUTPATIENT
Start: 2025-08-27

## 2025-08-27 RX ORDER — VALSARTAN 320 MG/1
320 TABLET ORAL DAILY
Qty: 90 TABLET | Refills: 1 | Status: SHIPPED | OUTPATIENT
Start: 2025-08-27

## 2025-08-27 RX ORDER — ASPIRIN 81 MG/1
81 TABLET ORAL DAILY
COMMUNITY